# Patient Record
Sex: FEMALE | Race: WHITE | NOT HISPANIC OR LATINO | Employment: FULL TIME | URBAN - METROPOLITAN AREA
[De-identification: names, ages, dates, MRNs, and addresses within clinical notes are randomized per-mention and may not be internally consistent; named-entity substitution may affect disease eponyms.]

---

## 2020-09-08 ENCOUNTER — OFFICE VISIT (OUTPATIENT)
Dept: SURGERY | Facility: CLINIC | Age: 50
End: 2020-09-08

## 2020-09-08 VITALS — WEIGHT: 136 LBS | BODY MASS INDEX: 25.03 KG/M2 | HEIGHT: 62 IN

## 2020-09-08 DIAGNOSIS — N18.5 CKD (CHRONIC KIDNEY DISEASE) STAGE 5, GFR LESS THAN 15 ML/MIN (HCC): Primary | ICD-10-CM

## 2020-09-08 DIAGNOSIS — I15.9 SECONDARY HYPERTENSION: ICD-10-CM

## 2020-09-08 DIAGNOSIS — Q61.3 POLYCYSTIC KIDNEY DISEASE: ICD-10-CM

## 2020-09-08 PROCEDURE — 99204 OFFICE O/P NEW MOD 45 MIN: CPT | Performed by: SURGERY

## 2020-09-08 RX ORDER — CALCIUM ACETATE 667 MG/1
1334 CAPSULE ORAL
COMMUNITY
Start: 2020-08-25

## 2020-09-08 RX ORDER — CALCITRIOL 0.25 UG/1
0.25 CAPSULE, LIQUID FILLED ORAL SEE ADMIN INSTRUCTIONS
COMMUNITY
Start: 2020-08-22 | End: 2021-01-25

## 2020-09-08 RX ORDER — SODIUM BICARBONATE 650 MG/1
650 TABLET ORAL 2 TIMES DAILY
COMMUNITY

## 2020-09-08 RX ORDER — CARVEDILOL 3.12 MG/1
3.12 TABLET ORAL 2 TIMES DAILY
COMMUNITY
Start: 2020-08-18

## 2020-09-08 RX ORDER — OMEPRAZOLE 20 MG/1
20 CAPSULE, DELAYED RELEASE ORAL DAILY
COMMUNITY

## 2020-09-08 RX ORDER — SPIRONOLACTONE 25 MG/1
25 TABLET ORAL DAILY
COMMUNITY
Start: 2020-08-11

## 2020-09-08 RX ORDER — FUROSEMIDE 80 MG
80 TABLET ORAL 2 TIMES DAILY
COMMUNITY
Start: 2020-08-17

## 2020-09-08 NOTE — PROGRESS NOTES
Chief Complaint   Patient presents with   • PD Catheter Consultation       Subjective      Joelle Robison is a 50 y.o. female who is referred by Evelyne Mojica MD to be evaluated for peritoneal dialysis catheter placement. 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 . Patient does not have a AV access.  Patient has not had a recent intraabdominal infection. She reports having regular bowel movements.  Patient did have a Colonoscopy.   She was recently seen by cardiologist for decrease cardiac ejection fraction of 20%.  She is being treated medically.  She denies any chest pain but reports shortness of breath    Past Medical History:   Diagnosis Date   • Anemia    • CKD (chronic kidney disease)    • Heart disease    • High blood pressure        Past Surgical History:   Procedure Laterality Date   • APPENDECTOMY N/A 1974         Current Outpatient Medications:   •  calcitriol (ROCALTROL) 0.25 MCG capsule, Take  by mouth See Admin Instructions. Take 1 tablet by mouth on Monday, Wednesday, and Friday, Disp: , Rfl:   •  calcium acetate (PHOS BINDER,) 667 MG capsule capsule, Take 667 mg by mouth 3 (Three) Times a Day With Meals., Disp: , Rfl:   •  carvedilol (COREG) 3.125 MG tablet, TAKE 1 TABLET BY MOUTH IN THE MORNING AND 2 TABLETS IN THE EVENING, Disp: , Rfl:   •  furosemide (LASIX) 80 MG tablet, Take 80 mg by mouth 2 (Two) Times a Day., Disp: , Rfl:   •  omeprazole (priLOSEC) 20 MG capsule, Take 20 mg by mouth Daily., Disp: , Rfl:   •  sodium bicarbonate 650 MG tablet, Take 650 mg by mouth 4 (Four) Times a Day., Disp: , Rfl:   •  spironolactone (ALDACTONE) 25 MG tablet, TAKE 1 2 (ONE HALF) TABLET BY MOUTH ONCE DAILY, Disp: , Rfl:     Allergies   Allergen Reactions   • Codeine GI Intolerance       Family History   Problem Relation Age of Onset   • Kidney cancer Mother        Social History     Socioeconomic History   • Marital status: Single     Spouse name:  "Not on file   • Number of children: Not on file   • Years of education: Not on file   • Highest education level: Not on file   Tobacco Use   • Smoking status: Former Smoker   • Smokeless tobacco: Never Used   Substance and Sexual Activity   • Alcohol use: Yes     Comment: occ   • Drug use: Never   • Sexual activity: Defer     REVIEW OF SYSTEMS    Review of Systems   Constitutional: Positive for fatigue. Negative for unexpected weight change.   HENT: Negative for congestion and rhinorrhea.    Eyes: Negative for discharge and itching.   Respiratory: Negative for apnea and cough.    Cardiovascular: Negative for chest pain and leg swelling.   Gastrointestinal: Negative for abdominal distention and abdominal pain.   Endocrine: Negative for cold intolerance and heat intolerance.   Genitourinary: Negative for difficulty urinating and frequency.   Musculoskeletal: Negative for joint swelling.   Skin: Negative for color change and pallor.   Allergic/Immunologic: Negative for environmental allergies and food allergies.   Neurological: Negative for dizziness and seizures.   Hematological: Negative for adenopathy. Does not bruise/bleed easily.   Psychiatric/Behavioral: Negative for agitation and sleep disturbance.       Physical Examination  Ht 157.5 cm (62\")   Wt 61.7 kg (136 lb)   BMI 24.87 kg/m²   Body mass index is 24.87 kg/m².  Physical Exam   Constitutional: She is oriented to person, place, and time. She appears well-developed and well-nourished.   HENT:   Head: Normocephalic and atraumatic.   Eyes: Conjunctivae are normal. No scleral icterus.   Neck: Normal range of motion. Neck supple.   Cardiovascular: Normal rate.   Pulmonary/Chest: Effort normal and breath sounds normal.   Abdominal: Soft. Bowel sounds are normal. She exhibits no distension. There is no tenderness. No hernia.   Well-healed right lower quadrant incision, no evidence of hernia.  Kidneys can be palpated below the costal margin bilaterally "   Musculoskeletal: Normal range of motion.   Neurological: She is alert and oriented to person, place, and time.   Skin: Skin is warm and dry.   Psychiatric: She has a normal mood and affect.     Labs reviewed on nephrologist note from 8/11/2020    Assessment:   Joelle Robison is a 50 y.o. female with chronic kidney disease stage V not on hemodialysis.  Never had a colonoscopy and has anemia.  She is being worked up by cardiologist for low ejection fraction.  Discussed with patient about further step.  I recommend that she undergoes peritoneal dialysis catheter placement as well as colonoscopy.  She wants to have the colonoscopy done in [place] and I agree with that.    The procedure was explained in detail to the patient including risks and benefits.  The benefits including the possibility of having dialysis at home without the side effects of the hemodialysis.  The risks including but not limited to catheter dislodgment, obstruction, malfunction, bleeding, infection and possible injury to surrounding organs during peritoneal access. The patient understands that the catheter will not be used for dialysis for a period of approximately 2 weeks after its placement and that during this time they should not shower until the exit site is completely healed.    Patient would like to see her cardiologist before proceeding with peritoneal dialysis.  She will need to discuss with Dr. Mojica about timing.    Plan:     -Patient to call when ready for peritoneal dialysis catheter placement    Narciso Deleon MD  General, Minimally Invasive and Endoscopic Surgery  [facility]    [address], [address]  [place], [place], [address]  P: [phone]  F: [phone]

## 2020-12-15 ENCOUNTER — TELEPHONE (OUTPATIENT)
Dept: SURGERY | Facility: CLINIC | Age: 50
End: 2020-12-15

## 2020-12-15 NOTE — TELEPHONE ENCOUNTER
Patient called to schedule a PD Cath placement. You saw her in September for the consult. Do you want to see her in the office again or place orders?

## 2020-12-16 NOTE — TELEPHONE ENCOUNTER
SPOKE TO ABRAN WITH DR PHAN OFFICE SHE WILL SPEAK TO HIM AND CALL ME BACK TO ADVISE IF APPT NEEDED OR LETTER CAN BE FAXED.

## 2020-12-21 NOTE — TELEPHONE ENCOUNTER
Spoke to Candace and she advised letter ready just waiting for Dr Rees to review and sign then she will fax to me.

## 2020-12-28 ENCOUNTER — PREP FOR SURGERY (OUTPATIENT)
Dept: OTHER | Facility: HOSPITAL | Age: 50
End: 2020-12-28

## 2020-12-28 DIAGNOSIS — N18.5 CKD (CHRONIC KIDNEY DISEASE) STAGE 5, GFR LESS THAN 15 ML/MIN (HCC): Primary | ICD-10-CM

## 2020-12-28 RX ORDER — CEFAZOLIN SODIUM 2 G/100ML
2 INJECTION, SOLUTION INTRAVENOUS ONCE
Status: CANCELLED | OUTPATIENT
Start: 2021-01-27 | End: 2020-12-28

## 2021-01-04 ENCOUNTER — TELEPHONE (OUTPATIENT)
Dept: SURGERY | Facility: CLINIC | Age: 51
End: 2021-01-04

## 2021-01-04 PROBLEM — N18.5 CKD (CHRONIC KIDNEY DISEASE) STAGE 5, GFR LESS THAN 15 ML/MIN (HCC): Status: ACTIVE | Noted: 2021-01-04

## 2021-01-04 NOTE — TELEPHONE ENCOUNTER
Patient called to inform of change of address. Said she just scheduled surgery and wanted to make sure of correct address as we were to be sending paperwork in the mail to her.

## 2021-01-25 ENCOUNTER — APPOINTMENT (OUTPATIENT)
Dept: PREADMISSION TESTING | Facility: HOSPITAL | Age: 51
End: 2021-01-25

## 2021-01-25 VITALS
HEART RATE: 107 BPM | RESPIRATION RATE: 20 BRPM | OXYGEN SATURATION: 98 % | DIASTOLIC BLOOD PRESSURE: 97 MMHG | TEMPERATURE: 98.5 F | WEIGHT: 149.1 LBS | HEIGHT: 62 IN | SYSTOLIC BLOOD PRESSURE: 144 MMHG | BODY MASS INDEX: 27.44 KG/M2

## 2021-01-25 DIAGNOSIS — N18.5 CKD (CHRONIC KIDNEY DISEASE) STAGE 5, GFR LESS THAN 15 ML/MIN (HCC): ICD-10-CM

## 2021-01-25 LAB
ANION GAP SERPL CALCULATED.3IONS-SCNC: 17.3 MMOL/L (ref 5–15)
BASOPHILS # BLD AUTO: 0.13 10*3/MM3 (ref 0–0.2)
BASOPHILS NFR BLD AUTO: 1.3 % (ref 0–1.5)
BUN SERPL-MCNC: 67 MG/DL (ref 6–20)
BUN/CREAT SERPL: 9.6 (ref 7–25)
CALCIUM SPEC-SCNC: 11.1 MG/DL (ref 8.6–10.5)
CHLORIDE SERPL-SCNC: 99 MMOL/L (ref 98–107)
CO2 SERPL-SCNC: 24.7 MMOL/L (ref 22–29)
CREAT SERPL-MCNC: 7.01 MG/DL (ref 0.57–1)
DEPRECATED RDW RBC AUTO: 54.2 FL (ref 37–54)
EOSINOPHIL # BLD AUTO: 0.75 10*3/MM3 (ref 0–0.4)
EOSINOPHIL NFR BLD AUTO: 7.5 % (ref 0.3–6.2)
ERYTHROCYTE [DISTWIDTH] IN BLOOD BY AUTOMATED COUNT: 14.7 % (ref 12.3–15.4)
GFR SERPL CREATININE-BSD FRML MDRD: 6 ML/MIN/1.73
GFR SERPL CREATININE-BSD FRML MDRD: ABNORMAL ML/MIN/{1.73_M2}
GLUCOSE SERPL-MCNC: 82 MG/DL (ref 65–99)
HCG SERPL QL: NEGATIVE
HCT VFR BLD AUTO: 32 % (ref 34–46.6)
HGB BLD-MCNC: 11.2 G/DL (ref 12–15.9)
IMM GRANULOCYTES # BLD AUTO: 0.02 10*3/MM3 (ref 0–0.05)
IMM GRANULOCYTES NFR BLD AUTO: 0.2 % (ref 0–0.5)
LYMPHOCYTES # BLD AUTO: 2.37 10*3/MM3 (ref 0.7–3.1)
LYMPHOCYTES NFR BLD AUTO: 23.7 % (ref 19.6–45.3)
MCH RBC QN AUTO: 35.7 PG (ref 26.6–33)
MCHC RBC AUTO-ENTMCNC: 35 G/DL (ref 31.5–35.7)
MCV RBC AUTO: 101.9 FL (ref 79–97)
MONOCYTES # BLD AUTO: 0.88 10*3/MM3 (ref 0.1–0.9)
MONOCYTES NFR BLD AUTO: 8.8 % (ref 5–12)
NEUTROPHILS NFR BLD AUTO: 5.83 10*3/MM3 (ref 1.7–7)
NEUTROPHILS NFR BLD AUTO: 58.5 % (ref 42.7–76)
NRBC BLD AUTO-RTO: 0 /100 WBC (ref 0–0.2)
PLATELET # BLD AUTO: 218 10*3/MM3 (ref 140–450)
PMV BLD AUTO: 10 FL (ref 6–12)
POTASSIUM SERPL-SCNC: 4.5 MMOL/L (ref 3.5–5.2)
QT INTERVAL: 406 MS
RBC # BLD AUTO: 3.14 10*6/MM3 (ref 3.77–5.28)
SODIUM SERPL-SCNC: 141 MMOL/L (ref 136–145)
WBC # BLD AUTO: 9.98 10*3/MM3 (ref 3.4–10.8)

## 2021-01-25 PROCEDURE — U0004 COV-19 TEST NON-CDC HGH THRU: HCPCS | Performed by: NURSE PRACTITIONER

## 2021-01-25 PROCEDURE — 36415 COLL VENOUS BLD VENIPUNCTURE: CPT

## 2021-01-25 PROCEDURE — 93010 ELECTROCARDIOGRAM REPORT: CPT | Performed by: INTERNAL MEDICINE

## 2021-01-25 PROCEDURE — 93005 ELECTROCARDIOGRAM TRACING: CPT

## 2021-01-25 PROCEDURE — 84703 CHORIONIC GONADOTROPIN ASSAY: CPT

## 2021-01-25 PROCEDURE — C9803 HOPD COVID-19 SPEC COLLECT: HCPCS | Performed by: NURSE PRACTITIONER

## 2021-01-25 PROCEDURE — 85025 COMPLETE CBC W/AUTO DIFF WBC: CPT

## 2021-01-25 PROCEDURE — 80048 BASIC METABOLIC PNL TOTAL CA: CPT

## 2021-01-25 RX ORDER — CARVEDILOL 3.12 MG/1
4.69 TABLET ORAL 2 TIMES DAILY WITH MEALS
Status: ON HOLD | COMMUNITY
End: 2022-08-06

## 2021-01-25 NOTE — DISCHARGE INSTRUCTIONS
Take the following medications the morning of surgery:  CARVEDILOL, OMEPRAZOLE    ARRIVE TO MAIN OR DESK 1- AT 10:00AM      If you are on prescription narcotic pain medication to control your pain you may also take that medication the morning of surgery.    General Instructions:  • Do not eat solid food after midnight the night before surgery.  • You may drink clear liquids day of surgery but must stop at least one hour before your hospital arrival time.(9:00AM)  • It is beneficial for you to have a clear drink that contains carbohydrates the day of surgery.  We suggest a 12 to 20 ounce bottle of Gatorade or Powerade for non-diabetic patients or a 12 to 20 ounce bottle of G2 or Powerade Zero for diabetic patients. (Pediatric patients, are not advised to drink a 12 to 20 ounce carbohydrate drink)    Clear liquids are liquids you can see through.  Nothing red in color.     Plain water                               Sports drinks  Sodas                                   Gelatin (Jell-O)  Fruit juices without pulp such as white grape juice and apple juice  Popsicles that contain no fruit or yogurt  Tea or coffee (no cream or milk added)  Gatorade / Powerade  G2 / Powerade Zero    • Infants may have breast milk up to four hours before surgery.  • Infants drinking formula may drink formula up to six hours before surgery.   • Patients who avoid smoking, chewing tobacco and alcohol for 4 weeks prior to surgery have a reduced risk of post-operative complications.  Quit smoking as many days before surgery as you can.  • Do not smoke, use chewing tobacco or drink alcohol the day of surgery.   • If applicable bring your C-PAP/ BI-PAP machine.  • Bring any papers given to you in the doctor’s office.  • Wear clean comfortable clothes.  • Do not wear contact lenses, false eyelashes or make-up.  Bring a case for your glasses.   • Bring crutches or walker if applicable.  • Remove all piercings.  Leave jewelry and any other  valuables at home.  • Hair extensions with metal clips must be removed prior to surgery.  • The Pre-Admission Testing nurse will instruct you to bring medications if unable to obtain an accurate list in Pre-Admission Testing.          Preventing a Surgical Site Infection:  • For 2 to 3 days before surgery, avoid shaving with a razor because the razor can irritate skin and make it easier to develop an infection.    • Any areas of open skin can increase the risk of a post-operative wound infection by allowing bacteria to enter and travel throughout the body.  Notify your surgeon if you have any skin wounds / rashes even if it is not near the expected surgical site.  The area will need assessed to determine if surgery should be delayed until it is healed.  • The night prior to surgery shower using a fresh bar of anti-bacterial soap (such as Dial) and clean washcloth.  Sleep in a clean bed with clean clothing.  Do not allow pets to sleep with you.  • Shower on the morning of surgery using a fresh bar of anti-bacterial soap (such as Dial) and clean washcloth.  Dry with a clean towel and dress in clean clothing.  • Ask your surgeon if you will be receiving antibiotics prior to surgery.  • Make sure you, your family, and all healthcare providers clean their hands with soap and water or an alcohol based hand  before caring for you or your wound.    Day of surgery:  Your arrival time is approximately two hours before your scheduled surgery time.  Upon arrival, a Pre-op nurse and Anesthesiologist will review your health history, obtain vital signs, and answer questions you may have.  The only belongings needed at this time will be a list of your home medications and if applicable your C-PAP/BI-PAP machine.  A Pre-op nurse will start an IV and you may receive medication in preparation for surgery, including something to help you relax.     Please be aware that surgery does come with discomfort.  We want to make every  effort to control your discomfort so please discuss any uncontrolled symptoms with your nurse.   Your doctor will most likely have prescribed pain medications.      If you are going home after surgery you will receive individualized written care instructions before being discharged.  A responsible adult must drive you to and from the hospital on the day of your surgery and stay with you for 24 hours.  Discharge prescriptions can be filled by the hospital pharmacy during regular pharmacy hours.  If you are having surgery late in the day/evening your prescription may be e-prescribed to your pharmacy.  Please verify your pharmacy hours or chose a 24 hour pharmacy to avoid not having access to your prescription because your pharmacy has closed for the day.    If you are staying overnight following surgery, you will be transported to your hospital room following the recovery period.  [facility] has all private rooms.    If you have any questions please call Pre-Admission Testing at [phone].  Deductibles and co-payments are collected on the day of service. Please be prepared to pay the required co-pay, deductible or deposit on the day of service as defined by your plan.    Patient Education for Self-Quarantine Process    Following your COVID testing, we strongly recommend that you do not leave your home after you have been tested for COVID except to get medical care. This includes not going to work, school or to public areas.  If this is not possible for you to do please limit your activities to only required outings.  Be sure to wear a mask when you are with other people, practice social distancing and wash your hands frequently.      The following items provide additional details to keep you safe.  • Wash your hands with soap and water frequently for at least 20 seconds.   • Avoid touching your eyes, nose and mouth with unwashed hands.  • Do not share anything - utensils, towels, food from the same  bowl.   • Have your own utensils, drinking glass, dishes, towels and bedding.   • Do not have visitors.   • Do use FaceTime to stay in touch with family and friends.  • You should stay in a specific room away from others if possible.   • Stay at least 6 feet away from others in the home if you cannot have a dedicated room to yourself.   • Do not snuggle with your pet. While the CDC says there is no evidence that pets can spread COVID-19 or be infected from humans, it is probably best to avoid “petting, snuggling, being kissed or licked and sharing food (during self-quarantine)”, according to the CDC.   • Sanitize household surfaces daily. Include all high touch areas (door handles, light switches, phones, countertops, etc.)  • Do not share a bathroom with others, if possible.   • Wear a mask around others in your home if you are unable to stay in a separate room or 6 feet apart. If  you are unable to wear a mask, have your family member wear a mask if they must be within 6 feet of you.   Call your surgeon immediately if you experience any of the following symptoms:  • Sore Throat  • Shortness of Breath or difficulty breathing  • Cough  • Chills  • Body soreness or muscle pain  • Headache  • Fever  • New loss of taste or smell  • Do not arrive for your surgery ill.  Your procedure will need to be rescheduled to another time.  You will need to call your physician before the day of surgery to avoid any unnecessary exposure to hospital staff as well as other patients.    CHLORHEXIDINE CLOTH INSTRUCTIONS  The morning of surgery follow these instructions using the Chlorhexidine cloths you've been given.  These steps reduce bacteria on the body.  Do not use the cloths near your eyes, ears mouth, genitalia or on open wounds.  Throw the cloths away after use but do not try to flush them down a toilet.      • Open and remove one cloth at a time from the package.    • Leave the cloth unfolded and begin the bathing.  • Massage  the skin with the cloths using gentle pressure to remove bacteria.  Do not scrub harshly.   • Follow the steps below with one 2% CHG cloth per area (6 total cloths).  • One cloth for neck, shoulders and chest.  • One cloth for both arms, hands, fingers and underarms (do underarms last).  • One cloth for the abdomen followed by groin.  • One cloth for right leg and foot including between the toes.  • One cloth for left leg and foot including between the toes.  • The last cloth is to be used for the back of the neck, back and buttocks.    Allow the CHG to air dry 3 minutes on the skin which will give it time to work and decrease the chance of irritation.  The skin may feel sticky until it is dry.  Do not rinse with water or any other liquid or you will lose the beneficial effects of the CHG.  If mild skin irritation occurs, do rinse the skin to remove the CHG.  Report this to the nurse at time of admission.  Do not apply lotions, creams, ointments, deodorants or perfumes after using the clothes. Dress in clean clothes before coming to the hospital.

## 2021-01-26 LAB — SARS-COV-2 RNA RESP QL NAA+PROBE: NOT DETECTED

## 2021-01-27 ENCOUNTER — ANESTHESIA (OUTPATIENT)
Dept: PERIOP | Facility: HOSPITAL | Age: 51
End: 2021-01-27

## 2021-01-27 ENCOUNTER — ANESTHESIA EVENT (OUTPATIENT)
Dept: PERIOP | Facility: HOSPITAL | Age: 51
End: 2021-01-27

## 2021-01-27 ENCOUNTER — HOSPITAL ENCOUNTER (OUTPATIENT)
Facility: HOSPITAL | Age: 51
Setting detail: HOSPITAL OUTPATIENT SURGERY
Discharge: HOME | End: 2021-01-27
Attending: SURGERY | Admitting: SURGERY

## 2021-01-27 VITALS
SYSTOLIC BLOOD PRESSURE: 122 MMHG | BODY MASS INDEX: 27.33 KG/M2 | HEIGHT: 62 IN | WEIGHT: 148.5 LBS | RESPIRATION RATE: 16 BRPM | OXYGEN SATURATION: 97 % | DIASTOLIC BLOOD PRESSURE: 93 MMHG | TEMPERATURE: 97.6 F | HEART RATE: 93 BPM

## 2021-01-27 DIAGNOSIS — Z99.2 PERITONEAL DIALYSIS CATHETER IN PLACE (HCC): Primary | ICD-10-CM

## 2021-01-27 DIAGNOSIS — N18.5 CKD (CHRONIC KIDNEY DISEASE) STAGE 5, GFR LESS THAN 15 ML/MIN (HCC): ICD-10-CM

## 2021-01-27 PROCEDURE — 49326 LAP W/OMENTOPEXY ADD-ON: CPT | Performed by: SPECIALIST/TECHNOLOGIST, OTHER

## 2021-01-27 PROCEDURE — 25010000002 FENTANYL CITRATE (PF) 100 MCG/2ML SOLUTION: Performed by: NURSE ANESTHETIST, CERTIFIED REGISTERED

## 2021-01-27 PROCEDURE — S0260 H&P FOR SURGERY: HCPCS | Performed by: SURGERY

## 2021-01-27 PROCEDURE — 25010000002 HEPARIN (PORCINE) PER 1000 UNITS: Performed by: SURGERY

## 2021-01-27 PROCEDURE — 25010000002 PHENYLEPHRINE PER 1 ML: Performed by: NURSE ANESTHETIST, CERTIFIED REGISTERED

## 2021-01-27 PROCEDURE — 25010000003 CEFAZOLIN IN DEXTROSE 2-4 GM/100ML-% SOLUTION: Performed by: SURGERY

## 2021-01-27 PROCEDURE — 25010000002 PROPOFOL 10 MG/ML EMULSION: Performed by: NURSE ANESTHETIST, CERTIFIED REGISTERED

## 2021-01-27 PROCEDURE — 63710000001 PROMETHAZINE PER 12.5 MG: Performed by: NURSE ANESTHETIST, CERTIFIED REGISTERED

## 2021-01-27 PROCEDURE — 25010000003 HEPARIN LOCK FLUSH PER 10 UNITS: Performed by: SURGERY

## 2021-01-27 PROCEDURE — 25010000002 ONDANSETRON PER 1 MG: Performed by: NURSE ANESTHETIST, CERTIFIED REGISTERED

## 2021-01-27 PROCEDURE — 49324 LAP INSERT TUNNEL IP CATH: CPT | Performed by: SPECIALIST/TECHNOLOGIST, OTHER

## 2021-01-27 PROCEDURE — 25010000002 HYDROMORPHONE PER 4 MG: Performed by: NURSE ANESTHETIST, CERTIFIED REGISTERED

## 2021-01-27 PROCEDURE — 49324 LAP INSERT TUNNEL IP CATH: CPT | Performed by: SURGERY

## 2021-01-27 PROCEDURE — 25010000002 DEXAMETHASONE PER 1 MG: Performed by: NURSE ANESTHETIST, CERTIFIED REGISTERED

## 2021-01-27 PROCEDURE — 49326 LAP W/OMENTOPEXY ADD-ON: CPT | Performed by: SURGERY

## 2021-01-27 PROCEDURE — C1750 CATH, HEMODIALYSIS,LONG-TERM: HCPCS | Performed by: SURGERY

## 2021-01-27 PROCEDURE — 25010000002 MIDAZOLAM PER 1 MG: Performed by: ANESTHESIOLOGY

## 2021-01-27 DEVICE — IMPLANTABLE DEVICE: Type: IMPLANTABLE DEVICE | Site: ABDOMEN | Status: FUNCTIONAL

## 2021-01-27 RX ORDER — DIPHENHYDRAMINE HYDROCHLORIDE 50 MG/ML
12.5 INJECTION INTRAMUSCULAR; INTRAVENOUS
Status: DISCONTINUED | OUTPATIENT
Start: 2021-01-27 | End: 2021-01-27 | Stop reason: HOSPADM

## 2021-01-27 RX ORDER — MIDAZOLAM HYDROCHLORIDE 1 MG/ML
2 INJECTION INTRAMUSCULAR; INTRAVENOUS
Status: DISCONTINUED | OUTPATIENT
Start: 2021-01-27 | End: 2021-01-27 | Stop reason: HOSPADM

## 2021-01-27 RX ORDER — ROCURONIUM BROMIDE 10 MG/ML
INJECTION, SOLUTION INTRAVENOUS AS NEEDED
Status: DISCONTINUED | OUTPATIENT
Start: 2021-01-27 | End: 2021-01-27 | Stop reason: SURG

## 2021-01-27 RX ORDER — HYDROCODONE BITARTRATE AND ACETAMINOPHEN 5; 325 MG/1; MG/1
1 TABLET ORAL EVERY 6 HOURS PRN
Qty: 30 TABLET | Refills: 0 | Status: SHIPPED | OUTPATIENT
Start: 2021-01-27 | End: 2021-02-10

## 2021-01-27 RX ORDER — HYDROCODONE BITARTRATE AND ACETAMINOPHEN 7.5; 325 MG/1; MG/1
1 TABLET ORAL ONCE AS NEEDED
Status: DISCONTINUED | OUTPATIENT
Start: 2021-01-27 | End: 2021-01-27 | Stop reason: HOSPADM

## 2021-01-27 RX ORDER — ACETAMINOPHEN 325 MG/1
650 TABLET ORAL ONCE
Status: COMPLETED | OUTPATIENT
Start: 2021-01-27 | End: 2021-01-27

## 2021-01-27 RX ORDER — CEFAZOLIN SODIUM 2 G/100ML
2 INJECTION, SOLUTION INTRAVENOUS ONCE
Status: COMPLETED | OUTPATIENT
Start: 2021-01-27 | End: 2021-01-27

## 2021-01-27 RX ORDER — FENTANYL CITRATE 50 UG/ML
INJECTION, SOLUTION INTRAMUSCULAR; INTRAVENOUS AS NEEDED
Status: DISCONTINUED | OUTPATIENT
Start: 2021-01-27 | End: 2021-01-27 | Stop reason: SURG

## 2021-01-27 RX ORDER — BUPIVACAINE HYDROCHLORIDE AND EPINEPHRINE 5; 5 MG/ML; UG/ML
INJECTION, SOLUTION PERINEURAL AS NEEDED
Status: DISCONTINUED | OUTPATIENT
Start: 2021-01-27 | End: 2021-01-27 | Stop reason: HOSPADM

## 2021-01-27 RX ORDER — SENNA PLUS 8.6 MG/1
1 TABLET ORAL ONCE
Status: COMPLETED | OUTPATIENT
Start: 2021-01-27 | End: 2021-01-27

## 2021-01-27 RX ORDER — FENTANYL CITRATE 50 UG/ML
25 INJECTION, SOLUTION INTRAMUSCULAR; INTRAVENOUS
Status: DISCONTINUED | OUTPATIENT
Start: 2021-01-27 | End: 2021-01-27 | Stop reason: HOSPADM

## 2021-01-27 RX ORDER — LABETALOL HYDROCHLORIDE 5 MG/ML
5 INJECTION, SOLUTION INTRAVENOUS
Status: DISCONTINUED | OUTPATIENT
Start: 2021-01-27 | End: 2021-01-27 | Stop reason: HOSPADM

## 2021-01-27 RX ORDER — PROMETHAZINE HYDROCHLORIDE 25 MG/1
12.5 TABLET ORAL ONCE AS NEEDED
Status: DISCONTINUED | OUTPATIENT
Start: 2021-01-27 | End: 2021-01-27 | Stop reason: HOSPADM

## 2021-01-27 RX ORDER — HYDROMORPHONE HYDROCHLORIDE 1 MG/ML
0.25 INJECTION, SOLUTION INTRAMUSCULAR; INTRAVENOUS; SUBCUTANEOUS
Status: DISCONTINUED | OUTPATIENT
Start: 2021-01-27 | End: 2021-01-27 | Stop reason: HOSPADM

## 2021-01-27 RX ORDER — SCOLOPAMINE TRANSDERMAL SYSTEM 1 MG/1
1 PATCH, EXTENDED RELEASE TRANSDERMAL
Status: DISCONTINUED | OUTPATIENT
Start: 2021-01-27 | End: 2021-01-27 | Stop reason: HOSPADM

## 2021-01-27 RX ORDER — AMOXICILLIN 250 MG
2 CAPSULE ORAL DAILY PRN
Qty: 30 TABLET | Refills: 1 | Status: SHIPPED | OUTPATIENT
Start: 2021-01-27 | End: 2022-01-27

## 2021-01-27 RX ORDER — ONDANSETRON 4 MG/1
4 TABLET, FILM COATED ORAL DAILY PRN
Qty: 30 TABLET | Refills: 1 | Status: SHIPPED | OUTPATIENT
Start: 2021-01-27 | End: 2021-02-10

## 2021-01-27 RX ORDER — FAMOTIDINE 10 MG/ML
20 INJECTION, SOLUTION INTRAVENOUS ONCE
Status: COMPLETED | OUTPATIENT
Start: 2021-01-27 | End: 2021-01-27

## 2021-01-27 RX ORDER — HYDROCODONE BITARTRATE AND ACETAMINOPHEN 5; 325 MG/1; MG/1
1 TABLET ORAL ONCE AS NEEDED
Status: CANCELLED | OUTPATIENT
Start: 2021-01-27 | End: 2021-02-03

## 2021-01-27 RX ORDER — PROPOFOL 10 MG/ML
VIAL (ML) INTRAVENOUS AS NEEDED
Status: DISCONTINUED | OUTPATIENT
Start: 2021-01-27 | End: 2021-01-27 | Stop reason: SURG

## 2021-01-27 RX ORDER — FENTANYL CITRATE 50 UG/ML
50 INJECTION, SOLUTION INTRAMUSCULAR; INTRAVENOUS
Status: DISCONTINUED | OUTPATIENT
Start: 2021-01-27 | End: 2021-01-27 | Stop reason: HOSPADM

## 2021-01-27 RX ORDER — PROMETHAZINE HYDROCHLORIDE 25 MG/1
25 SUPPOSITORY RECTAL ONCE AS NEEDED
Status: COMPLETED | OUTPATIENT
Start: 2021-01-27 | End: 2021-01-27

## 2021-01-27 RX ORDER — MIDAZOLAM HYDROCHLORIDE 1 MG/ML
1 INJECTION INTRAMUSCULAR; INTRAVENOUS
Status: DISCONTINUED | OUTPATIENT
Start: 2021-01-27 | End: 2021-01-27 | Stop reason: HOSPADM

## 2021-01-27 RX ORDER — SODIUM CHLORIDE, SODIUM LACTATE, POTASSIUM CHLORIDE, CALCIUM CHLORIDE 600; 310; 30; 20 MG/100ML; MG/100ML; MG/100ML; MG/100ML
INJECTION, SOLUTION INTRAVENOUS CONTINUOUS PRN
Status: DISCONTINUED | OUTPATIENT
Start: 2021-01-27 | End: 2021-01-27 | Stop reason: SURG

## 2021-01-27 RX ORDER — SODIUM CHLORIDE 0.9 % (FLUSH) 0.9 %
3 SYRINGE (ML) INJECTION EVERY 12 HOURS SCHEDULED
Status: DISCONTINUED | OUTPATIENT
Start: 2021-01-27 | End: 2021-01-27 | Stop reason: HOSPADM

## 2021-01-27 RX ORDER — PROMETHAZINE HYDROCHLORIDE 25 MG/1
25 TABLET ORAL ONCE AS NEEDED
Status: COMPLETED | OUTPATIENT
Start: 2021-01-27 | End: 2021-01-27

## 2021-01-27 RX ORDER — EPHEDRINE SULFATE 50 MG/ML
5 INJECTION, SOLUTION INTRAVENOUS ONCE AS NEEDED
Status: DISCONTINUED | OUTPATIENT
Start: 2021-01-27 | End: 2021-01-27 | Stop reason: HOSPADM

## 2021-01-27 RX ORDER — HEPARIN SODIUM (PORCINE) LOCK FLUSH IV SOLN 100 UNIT/ML 100 UNIT/ML
SOLUTION INTRAVENOUS AS NEEDED
Status: DISCONTINUED | OUTPATIENT
Start: 2021-01-27 | End: 2021-01-27 | Stop reason: HOSPADM

## 2021-01-27 RX ORDER — NALOXONE HCL 0.4 MG/ML
0.2 VIAL (ML) INJECTION AS NEEDED
Status: DISCONTINUED | OUTPATIENT
Start: 2021-01-27 | End: 2021-01-27 | Stop reason: HOSPADM

## 2021-01-27 RX ORDER — DIPHENHYDRAMINE HCL 25 MG
25 CAPSULE ORAL
Status: DISCONTINUED | OUTPATIENT
Start: 2021-01-27 | End: 2021-01-27 | Stop reason: HOSPADM

## 2021-01-27 RX ORDER — ONDANSETRON 2 MG/ML
4 INJECTION INTRAMUSCULAR; INTRAVENOUS ONCE AS NEEDED
Status: COMPLETED | OUTPATIENT
Start: 2021-01-27 | End: 2021-01-27

## 2021-01-27 RX ORDER — SODIUM CHLORIDE 0.9 % (FLUSH) 0.9 %
3-10 SYRINGE (ML) INJECTION AS NEEDED
Status: DISCONTINUED | OUTPATIENT
Start: 2021-01-27 | End: 2021-01-27 | Stop reason: HOSPADM

## 2021-01-27 RX ORDER — FLUMAZENIL 0.1 MG/ML
0.2 INJECTION INTRAVENOUS AS NEEDED
Status: DISCONTINUED | OUTPATIENT
Start: 2021-01-27 | End: 2021-01-27 | Stop reason: HOSPADM

## 2021-01-27 RX ORDER — EPHEDRINE SULFATE 50 MG/ML
INJECTION, SOLUTION INTRAVENOUS AS NEEDED
Status: DISCONTINUED | OUTPATIENT
Start: 2021-01-27 | End: 2021-01-27 | Stop reason: SURG

## 2021-01-27 RX ORDER — ONDANSETRON 2 MG/ML
INJECTION INTRAMUSCULAR; INTRAVENOUS AS NEEDED
Status: DISCONTINUED | OUTPATIENT
Start: 2021-01-27 | End: 2021-01-27 | Stop reason: SURG

## 2021-01-27 RX ORDER — DEXAMETHASONE SODIUM PHOSPHATE 10 MG/ML
INJECTION INTRAMUSCULAR; INTRAVENOUS AS NEEDED
Status: DISCONTINUED | OUTPATIENT
Start: 2021-01-27 | End: 2021-01-27 | Stop reason: SURG

## 2021-01-27 RX ORDER — LIDOCAINE HYDROCHLORIDE 20 MG/ML
INJECTION, SOLUTION INFILTRATION; PERINEURAL AS NEEDED
Status: DISCONTINUED | OUTPATIENT
Start: 2021-01-27 | End: 2021-01-27 | Stop reason: SURG

## 2021-01-27 RX ORDER — SODIUM CHLORIDE 9 MG/ML
9 INJECTION, SOLUTION INTRAVENOUS CONTINUOUS PRN
Status: DISCONTINUED | OUTPATIENT
Start: 2021-01-27 | End: 2021-01-27 | Stop reason: HOSPADM

## 2021-01-27 RX ORDER — OXYCODONE AND ACETAMINOPHEN 7.5; 325 MG/1; MG/1
1 TABLET ORAL ONCE AS NEEDED
Status: DISCONTINUED | OUTPATIENT
Start: 2021-01-27 | End: 2021-01-27 | Stop reason: HOSPADM

## 2021-01-27 RX ORDER — LIDOCAINE HYDROCHLORIDE 10 MG/ML
0.5 INJECTION, SOLUTION EPIDURAL; INFILTRATION; INTRACAUDAL; PERINEURAL ONCE AS NEEDED
Status: DISCONTINUED | OUTPATIENT
Start: 2021-01-27 | End: 2021-01-27 | Stop reason: HOSPADM

## 2021-01-27 RX ADMIN — FENTANYL CITRATE 50 MCG: 50 INJECTION INTRAMUSCULAR; INTRAVENOUS at 11:52

## 2021-01-27 RX ADMIN — PHENYLEPHRINE HYDROCHLORIDE 100 MCG: 10 INJECTION INTRAVENOUS at 11:59

## 2021-01-27 RX ADMIN — LIDOCAINE HYDROCHLORIDE 60 MG: 20 INJECTION, SOLUTION INFILTRATION; PERINEURAL at 11:52

## 2021-01-27 RX ADMIN — FAMOTIDINE 20 MG: 10 INJECTION INTRAVENOUS at 11:09

## 2021-01-27 RX ADMIN — PROPOFOL 50 MG: 10 INJECTION, EMULSION INTRAVENOUS at 11:53

## 2021-01-27 RX ADMIN — PROMETHAZINE HYDROCHLORIDE 25 MG: 12.5 TABLET ORAL at 17:13

## 2021-01-27 RX ADMIN — SODIUM CHLORIDE 9 ML/HR: 9 INJECTION, SOLUTION INTRAVENOUS at 11:16

## 2021-01-27 RX ADMIN — SODIUM CHLORIDE, POTASSIUM CHLORIDE, SODIUM LACTATE AND CALCIUM CHLORIDE: 600; 310; 30; 20 INJECTION, SOLUTION INTRAVENOUS at 11:50

## 2021-01-27 RX ADMIN — SENNOSIDES 1 TABLET: 8.6 TABLET, FILM COATED ORAL at 15:20

## 2021-01-27 RX ADMIN — PHENYLEPHRINE HYDROCHLORIDE 150 MCG: 10 INJECTION INTRAVENOUS at 12:20

## 2021-01-27 RX ADMIN — EPHEDRINE SULFATE 10 MG: 50 INJECTION INTRAVENOUS at 12:20

## 2021-01-27 RX ADMIN — PHENYLEPHRINE HYDROCHLORIDE 150 MCG: 10 INJECTION INTRAVENOUS at 12:05

## 2021-01-27 RX ADMIN — ONDANSETRON 4 MG: 2 INJECTION INTRAMUSCULAR; INTRAVENOUS at 14:45

## 2021-01-27 RX ADMIN — PHENYLEPHRINE HYDROCHLORIDE 250 MCG: 10 INJECTION INTRAVENOUS at 12:23

## 2021-01-27 RX ADMIN — SCOPALAMINE 1 PATCH: 1 PATCH, EXTENDED RELEASE TRANSDERMAL at 11:09

## 2021-01-27 RX ADMIN — DEXAMETHASONE SODIUM PHOSPHATE 8 MG: 10 INJECTION INTRAMUSCULAR; INTRAVENOUS at 11:57

## 2021-01-27 RX ADMIN — ROCURONIUM BROMIDE 5 MG: 10 INJECTION INTRAVENOUS at 12:10

## 2021-01-27 RX ADMIN — PHENYLEPHRINE HYDROCHLORIDE 200 MCG: 10 INJECTION INTRAVENOUS at 12:27

## 2021-01-27 RX ADMIN — ONDANSETRON HYDROCHLORIDE 4 MG: 2 SOLUTION INTRAMUSCULAR; INTRAVENOUS at 12:25

## 2021-01-27 RX ADMIN — FENTANYL CITRATE 25 MCG: 50 INJECTION, SOLUTION INTRAMUSCULAR; INTRAVENOUS at 13:15

## 2021-01-27 RX ADMIN — ROCURONIUM BROMIDE 20 MG: 10 INJECTION INTRAVENOUS at 11:52

## 2021-01-27 RX ADMIN — FENTANYL CITRATE 25 MCG: 50 INJECTION, SOLUTION INTRAMUSCULAR; INTRAVENOUS at 13:10

## 2021-01-27 RX ADMIN — PHENYLEPHRINE HYDROCHLORIDE 150 MCG: 10 INJECTION INTRAVENOUS at 12:25

## 2021-01-27 RX ADMIN — EPHEDRINE SULFATE 15 MG: 50 INJECTION INTRAVENOUS at 12:23

## 2021-01-27 RX ADMIN — EPHEDRINE SULFATE 15 MG: 50 INJECTION INTRAVENOUS at 12:25

## 2021-01-27 RX ADMIN — EPHEDRINE SULFATE 10 MG: 50 INJECTION INTRAVENOUS at 12:27

## 2021-01-27 RX ADMIN — ACETAMINOPHEN 650 MG: 325 TABLET, FILM COATED ORAL at 14:40

## 2021-01-27 RX ADMIN — MIDAZOLAM 1 MG: 1 INJECTION INTRAMUSCULAR; INTRAVENOUS at 11:39

## 2021-01-27 RX ADMIN — PHENYLEPHRINE HYDROCHLORIDE 200 MCG: 10 INJECTION INTRAVENOUS at 12:08

## 2021-01-27 RX ADMIN — CEFAZOLIN SODIUM 2 G: 2 INJECTION, SOLUTION INTRAVENOUS at 11:58

## 2021-01-27 RX ADMIN — HYDROMORPHONE HYDROCHLORIDE 0.25 MG: 1 INJECTION, SOLUTION INTRAMUSCULAR; INTRAVENOUS; SUBCUTANEOUS at 13:45

## 2021-01-27 RX ADMIN — HYDROMORPHONE HYDROCHLORIDE 0.25 MG: 1 INJECTION, SOLUTION INTRAMUSCULAR; INTRAVENOUS; SUBCUTANEOUS at 13:50

## 2021-01-27 RX ADMIN — PROPOFOL 150 MG: 10 INJECTION, EMULSION INTRAVENOUS at 11:52

## 2021-01-27 RX ADMIN — SUGAMMADEX 200 MG: 100 INJECTION, SOLUTION INTRAVENOUS at 12:33

## 2021-01-27 RX ADMIN — FENTANYL CITRATE 50 MCG: 50 INJECTION INTRAMUSCULAR; INTRAVENOUS at 12:36

## 2021-01-27 NOTE — ANESTHESIA PREPROCEDURE EVALUATION
Anesthesia Evaluation     Patient summary reviewed and Nursing notes reviewed   history of anesthetic complications: PONV               Airway   Mallampati: II  TM distance: >3 FB  Neck ROM: full  Dental      Pulmonary - negative pulmonary ROS   Cardiovascular     ECG reviewed  Rhythm: irregular    (+) hypertension, dysrhythmias PVC,       Neuro/Psych- negative ROS  GI/Hepatic/Renal/Endo    (+)  GERD,  renal disease ESRD,     Musculoskeletal (-) negative ROS    Abdominal    Substance History - negative use     OB/GYN negative ob/gyn ROS         Other                        Anesthesia Plan    ASA 3     general     intravenous induction     Anesthetic plan, all risks, benefits, and alternatives have been provided, discussed and informed consent has been obtained with: patient.

## 2021-01-27 NOTE — ANESTHESIA POSTPROCEDURE EVALUATION
Patient: Joelle Robison    Procedure Summary     Date: 01/27/21 Room / Location: [facility] OR  / [facility]IN OR    Anesthesia Start: 1148 Anesthesia Stop: 1300    Procedure: INSERTION PERITONEAL DIALYSIS CATHETER LAPAROSCOPIC OMENTOPEXY (N/A Abdomen) Diagnosis:       CKD (chronic kidney disease) stage 5, GFR less than 15 ml/min (CMS/HCC)      (CKD (chronic kidney disease) stage 5, GFR less than 15 ml/min (CMS/HCC) [N18.5])    Surgeon: Narciso Deleon MD Provider: David Milligan MD    Anesthesia Type: general ASA Status: 3          Anesthesia Type: general    Vitals  Vitals Value Taken Time   /82 01/27/21 1315   Temp     Pulse 85 01/27/21 1321   Resp     SpO2 97 % 01/27/21 1321   Vitals shown include unvalidated device data.        Anesthesia Post Evaluation

## 2021-01-27 NOTE — ANESTHESIA PROCEDURE NOTES
Airway  Urgency: elective    Date/Time: 1/27/2021 11:54 AM  Airway not difficult    General Information and Staff    Patient location during procedure: OR  Anesthesiologist: David Milligan MD  CRNA: Evita Srinivasan CRNA    Indications and Patient Condition  Indications for airway management: airway protection    Preoxygenated: yes  MILS maintained throughout  Mask difficulty assessment: 2 - vent by mask + OA or adjuvant +/- NMBA    Final Airway Details  Final airway type: endotracheal airway      Successful airway: ETT  Cuffed: yes   Successful intubation technique: direct laryngoscopy  Facilitating devices/methods: intubating stylet and cricoid pressure  Endotracheal tube insertion site: oral  Blade: Yara  Blade size: 3  ETT size (mm): 7.0  Cormack-Lehane Classification: grade I - full view of glottis  Placement verified by: chest auscultation and capnometry   Cuff volume (mL): 8  Measured from: teeth  ETT/EBT  to teeth (cm): 21  Number of attempts at approach: 1  Assessment: lips, teeth, and gum same as pre-op and atraumatic intubation

## 2021-01-27 NOTE — ANESTHESIA POSTPROCEDURE EVALUATION
Patient: Joelle Robison    Procedure Summary     Date: 01/27/21 Room / Location: [facility] OR  / [facility] MAIN OR    Anesthesia Start: 1148 Anesthesia Stop: 1300    Procedure: INSERTION PERITONEAL DIALYSIS CATHETER LAPAROSCOPIC OMENTOPEXY (N/A Abdomen) Diagnosis:       CKD (chronic kidney disease) stage 5, GFR less than 15 ml/min (CMS/HCC)      (CKD (chronic kidney disease) stage 5, GFR less than 15 ml/min (CMS/HCC) [N18.5])    Surgeon: Narciso Deleon MD Provider: David Milligan MD    Anesthesia Type: general ASA Status: 3          Anesthesia Type: general    Vitals  Vitals Value Taken Time   BP 97/62 01/27/21 1400   Temp 36.4 °C (97.6 °F) 01/27/21 1255   Pulse 86 01/27/21 1404   Resp 16 01/27/21 1330   SpO2 88 % 01/27/21 1404   Vitals shown include unvalidated device data.        Post Anesthesia Care and Evaluation    Patient location during evaluation: PACU  Patient participation: complete - patient participated  Level of consciousness: awake and alert  Pain management: adequate  Airway patency: patent  Anesthetic complications: No anesthetic complications    Cardiovascular status: acceptable  Respiratory status: acceptable  Hydration status: acceptable    Comments: --------------------            01/27/21               1330     --------------------   BP:       107/94     Pulse:      86       Resp:       16       Temp:                SpO2:      96%      Patient has cardiology and nephrology specialists following her and hs f/u appts with both in the near term that are addressing her kidney dysfunction/possible transplant candidacy and heart rhythm pathos  --------------------

## 2021-02-10 ENCOUNTER — OFFICE VISIT (OUTPATIENT)
Dept: SURGERY | Facility: CLINIC | Age: 51
End: 2021-02-10

## 2021-02-10 VITALS — BODY MASS INDEX: 27.38 KG/M2 | WEIGHT: 148.8 LBS | HEIGHT: 62 IN

## 2021-02-10 DIAGNOSIS — Z09 POSTOP CHECK: ICD-10-CM

## 2021-02-10 DIAGNOSIS — Z51.89 VISIT FOR WOUND CHECK: Primary | ICD-10-CM

## 2021-02-10 PROCEDURE — 99212 OFFICE O/P EST SF 10 MIN: CPT | Performed by: SURGERY

## 2021-02-10 NOTE — PROGRESS NOTES
CC: Postop check    S: This is a 50 y.o. female who presents for a post-operative visit after undergoing a Laparoscopic Peritoneal dialysis catheter placement and laparoscopic omentopexy on 1/27/2021.     Patient reports she is doing well. Eating well without any significant nausea. Having good bowel function. No problems with constipation or diarrhea. No urinary complaints. Denies fever. Ambulating well and slowly returning to normal activities. Has been following with the PD nurse and catheter will be flush on Friday    soft, nontender, nondistended, no masses or organomegaly  Incisions are healing well without any erythema or signs of infection. No signs of hernia. The catheter exit site is clean, dry and intact.      Assessment and plan:     The patient is a very pleasant 50 y.o. female s/p laparoscopic peritoneal dialysis catheter placement and omentopexy.  Patient is doing fine and does not have any specific complaints other than mild incisional pain.     - The patient was advised to continue to refrain from showering for 3 weeks but can have sponge bath  - continued to follow with the peritoneal dialysis catheter nurse for catheter flushes and training  -  can start using the catheter at 3 weeks from surgery      I advised the patient to continue to refrain from doing any heavy lifting of more than 15 pounds for a total of 6 weeks.  Patient may start doing an aerobic type exercise in 4 weeks after surgery.    Patient was given time to ask questions and all of them were answered appropriately.  The patient verbalized understanding and agreed with the plan.    she will follow-up at our office on a prn basis unless there are any problems.

## 2022-08-06 ENCOUNTER — APPOINTMENT (OUTPATIENT)
Dept: CT IMAGING | Facility: HOSPITAL | Age: 52
End: 2022-08-06

## 2022-08-06 ENCOUNTER — HOSPITAL ENCOUNTER (INPATIENT)
Facility: HOSPITAL | Age: 52
LOS: 6 days | Discharge: HOME | End: 2022-08-13
Attending: STUDENT IN AN ORGANIZED HEALTH CARE EDUCATION/TRAINING PROGRAM | Admitting: FAMILY MEDICINE

## 2022-08-06 ENCOUNTER — APPOINTMENT (OUTPATIENT)
Dept: GENERAL RADIOLOGY | Facility: HOSPITAL | Age: 52
End: 2022-08-06

## 2022-08-06 DIAGNOSIS — A04.72 CLOSTRIDIUM DIFFICILE DIARRHEA: Primary | ICD-10-CM

## 2022-08-06 DIAGNOSIS — N18.5 CKD (CHRONIC KIDNEY DISEASE) STAGE 5, GFR LESS THAN 15 ML/MIN: ICD-10-CM

## 2022-08-06 PROBLEM — A08.39 GASTROENTERITIS DUE TO COVID-19 VIRUS: Status: ACTIVE | Noted: 2022-08-06

## 2022-08-06 PROBLEM — N18.6 ESRD (END STAGE RENAL DISEASE) (HCC): Status: ACTIVE | Noted: 2022-08-06

## 2022-08-06 PROBLEM — U07.1 GASTROENTERITIS DUE TO COVID-19 VIRUS: Status: ACTIVE | Noted: 2022-08-06

## 2022-08-06 LAB
027 TOXIN: ABNORMAL
ALBUMIN SERPL-MCNC: 3.8 G/DL (ref 3.5–5.2)
ALBUMIN/GLOB SERPL: 1.3 G/DL
ALP SERPL-CCNC: 76 U/L (ref 39–117)
ALT SERPL W P-5'-P-CCNC: 13 U/L (ref 1–33)
ANION GAP SERPL CALCULATED.3IONS-SCNC: 26.2 MMOL/L (ref 5–15)
APPEARANCE FLD: CLEAR
AST SERPL-CCNC: 20 U/L (ref 1–32)
BASOPHILS # BLD AUTO: 0.01 10*3/MM3 (ref 0–0.2)
BASOPHILS NFR BLD AUTO: 0.2 % (ref 0–1.5)
BILIRUB SERPL-MCNC: 0.3 MG/DL (ref 0–1.2)
BUN SERPL-MCNC: 77 MG/DL (ref 6–20)
BUN/CREAT SERPL: 5.7 (ref 7–25)
C DIFF TOX GENS STL QL NAA+PROBE: POSITIVE
CALCIUM SPEC-SCNC: 8.6 MG/DL (ref 8.6–10.5)
CHLORIDE SERPL-SCNC: 100 MMOL/L (ref 98–107)
CO2 SERPL-SCNC: 11.8 MMOL/L (ref 22–29)
COLOR FLD: YELLOW
CREAT SERPL-MCNC: 13.46 MG/DL (ref 0.57–1)
D-LACTATE SERPL-SCNC: 0.8 MMOL/L (ref 0.5–2)
DEPRECATED RDW RBC AUTO: 47.8 FL (ref 37–54)
EGFRCR SERPLBLD CKD-EPI 2021: 3 ML/MIN/1.73
EOSINOPHIL # BLD AUTO: 0 10*3/MM3 (ref 0–0.4)
EOSINOPHIL NFR BLD AUTO: 0 % (ref 0.3–6.2)
ERYTHROCYTE [DISTWIDTH] IN BLOOD BY AUTOMATED COUNT: 13 % (ref 12.3–15.4)
GLOBULIN UR ELPH-MCNC: 3 GM/DL
GLUCOSE SERPL-MCNC: 96 MG/DL (ref 65–99)
HCT VFR BLD AUTO: 23.7 % (ref 34–46.6)
HGB BLD-MCNC: 7.9 G/DL (ref 12–15.9)
IMM GRANULOCYTES # BLD AUTO: 0.02 10*3/MM3 (ref 0–0.05)
IMM GRANULOCYTES NFR BLD AUTO: 0.5 % (ref 0–0.5)
LIPASE SERPL-CCNC: 181 U/L (ref 13–60)
LYMPHOCYTES # BLD AUTO: 0.88 10*3/MM3 (ref 0.7–3.1)
LYMPHOCYTES NFR BLD AUTO: 21.1 % (ref 19.6–45.3)
LYMPHOCYTES NFR FLD MANUAL: 18 %
MACROPHAGE FLUID: 30 %
MAGNESIUM SERPL-MCNC: 1.3 MG/DL (ref 1.6–2.6)
MCH RBC QN AUTO: 33.8 PG (ref 26.6–33)
MCHC RBC AUTO-ENTMCNC: 33.3 G/DL (ref 31.5–35.7)
MCV RBC AUTO: 101.3 FL (ref 79–97)
MESOTHL CELL NFR FLD MANUAL: 1 %
MONOCYTES # BLD AUTO: 0.27 10*3/MM3 (ref 0.1–0.9)
MONOCYTES NFR BLD AUTO: 6.5 % (ref 5–12)
MONOCYTES NFR FLD: 47 %
MRSA DNA SPEC QL NAA+PROBE: ABNORMAL
NEUTROPHILS NFR BLD AUTO: 2.99 10*3/MM3 (ref 1.7–7)
NEUTROPHILS NFR BLD AUTO: 71.7 % (ref 42.7–76)
NEUTROPHILS NFR FLD MANUAL: 4 %
NRBC BLD AUTO-RTO: 0 /100 WBC (ref 0–0.2)
NUC CELL # FLD: 213 /MM3
PLATELET # BLD AUTO: 90 10*3/MM3 (ref 140–450)
PMV BLD AUTO: 10.5 FL (ref 6–12)
POTASSIUM SERPL-SCNC: 5 MMOL/L (ref 3.5–5.2)
PROCALCITONIN SERPL-MCNC: 16.13 NG/ML (ref 0–0.25)
PROT SERPL-MCNC: 6.8 G/DL (ref 6–8.5)
RBC # BLD AUTO: 2.34 10*6/MM3 (ref 3.77–5.28)
RBC # FLD AUTO: <2000 /MM3
SODIUM SERPL-SCNC: 138 MMOL/L (ref 136–145)
WBC NRBC COR # BLD: 4.17 10*3/MM3 (ref 3.4–10.8)

## 2022-08-06 PROCEDURE — 71250 CT THORAX DX C-: CPT

## 2022-08-06 PROCEDURE — 84145 PROCALCITONIN (PCT): CPT | Performed by: INTERNAL MEDICINE

## 2022-08-06 PROCEDURE — 74176 CT ABD & PELVIS W/O CONTRAST: CPT

## 2022-08-06 PROCEDURE — 25010000002 VANCOMYCIN 5 G RECONSTITUTED SOLUTION: Performed by: INTERNAL MEDICINE

## 2022-08-06 PROCEDURE — 87493 C DIFF AMPLIFIED PROBE: CPT | Performed by: INTERNAL MEDICINE

## 2022-08-06 PROCEDURE — 83605 ASSAY OF LACTIC ACID: CPT | Performed by: INTERNAL MEDICINE

## 2022-08-06 PROCEDURE — 85025 COMPLETE CBC W/AUTO DIFF WBC: CPT | Performed by: INTERNAL MEDICINE

## 2022-08-06 PROCEDURE — 99222 1ST HOSP IP/OBS MODERATE 55: CPT | Performed by: INTERNAL MEDICINE

## 2022-08-06 PROCEDURE — 87040 BLOOD CULTURE FOR BACTERIA: CPT | Performed by: INTERNAL MEDICINE

## 2022-08-06 PROCEDURE — 83690 ASSAY OF LIPASE: CPT | Performed by: INTERNAL MEDICINE

## 2022-08-06 PROCEDURE — G0378 HOSPITAL OBSERVATION PER HR: HCPCS

## 2022-08-06 PROCEDURE — 87505 NFCT AGENT DETECTION GI: CPT | Performed by: INTERNAL MEDICINE

## 2022-08-06 PROCEDURE — 87641 MR-STAPH DNA AMP PROBE: CPT | Performed by: INTERNAL MEDICINE

## 2022-08-06 PROCEDURE — 3E1M39Z IRRIGATION OF PERITONEAL CAVITY USING DIALYSATE, PERCUTANEOUS APPROACH: ICD-10-PCS | Performed by: INTERNAL MEDICINE

## 2022-08-06 PROCEDURE — 87205 SMEAR GRAM STAIN: CPT | Performed by: INTERNAL MEDICINE

## 2022-08-06 PROCEDURE — 80053 COMPREHEN METABOLIC PANEL: CPT | Performed by: INTERNAL MEDICINE

## 2022-08-06 PROCEDURE — 25010000002 PIPERACILLIN SOD-TAZOBACTAM PER 1 G: Performed by: INTERNAL MEDICINE

## 2022-08-06 PROCEDURE — 87070 CULTURE OTHR SPECIMN AEROBIC: CPT | Performed by: INTERNAL MEDICINE

## 2022-08-06 PROCEDURE — 71045 X-RAY EXAM CHEST 1 VIEW: CPT

## 2022-08-06 PROCEDURE — 25010000002 MAGNESIUM SULFATE 2 GM/50ML SOLUTION: Performed by: INTERNAL MEDICINE

## 2022-08-06 PROCEDURE — 89051 BODY FLUID CELL COUNT: CPT | Performed by: INTERNAL MEDICINE

## 2022-08-06 PROCEDURE — 83735 ASSAY OF MAGNESIUM: CPT | Performed by: INTERNAL MEDICINE

## 2022-08-06 RX ORDER — ACETAMINOPHEN 160 MG/5ML
650 SOLUTION ORAL EVERY 4 HOURS PRN
Status: DISCONTINUED | OUTPATIENT
Start: 2022-08-06 | End: 2022-08-13 | Stop reason: HOSPADM

## 2022-08-06 RX ORDER — ACETAMINOPHEN 325 MG/1
650 TABLET ORAL EVERY 4 HOURS PRN
Status: DISCONTINUED | OUTPATIENT
Start: 2022-08-06 | End: 2022-08-13 | Stop reason: HOSPADM

## 2022-08-06 RX ORDER — SODIUM CHLORIDE 0.9 % (FLUSH) 0.9 %
10 SYRINGE (ML) INJECTION AS NEEDED
Status: DISCONTINUED | OUTPATIENT
Start: 2022-08-06 | End: 2022-08-13 | Stop reason: HOSPADM

## 2022-08-06 RX ORDER — CHOLECALCIFEROL (VITAMIN D3) 125 MCG
5 CAPSULE ORAL NIGHTLY PRN
Status: DISCONTINUED | OUTPATIENT
Start: 2022-08-06 | End: 2022-08-13 | Stop reason: HOSPADM

## 2022-08-06 RX ORDER — OXYCODONE AND ACETAMINOPHEN 10; 325 MG/1; MG/1
1 TABLET ORAL EVERY 8 HOURS PRN
Status: DISPENSED | OUTPATIENT
Start: 2022-08-06 | End: 2022-08-13

## 2022-08-06 RX ORDER — FAMOTIDINE 10 MG
10 TABLET ORAL NIGHTLY
Status: DISCONTINUED | OUTPATIENT
Start: 2022-08-06 | End: 2022-08-13 | Stop reason: HOSPADM

## 2022-08-06 RX ORDER — ONDANSETRON 2 MG/ML
4 INJECTION INTRAMUSCULAR; INTRAVENOUS EVERY 6 HOURS PRN
Status: DISCONTINUED | OUTPATIENT
Start: 2022-08-06 | End: 2022-08-13 | Stop reason: HOSPADM

## 2022-08-06 RX ORDER — SODIUM CHLORIDE 9 MG/ML
40 INJECTION, SOLUTION INTRAVENOUS AS NEEDED
Status: DISCONTINUED | OUTPATIENT
Start: 2022-08-06 | End: 2022-08-13 | Stop reason: HOSPADM

## 2022-08-06 RX ORDER — SODIUM BICARBONATE 650 MG/1
1300 TABLET ORAL 3 TIMES DAILY
Status: DISCONTINUED | OUTPATIENT
Start: 2022-08-06 | End: 2022-08-11

## 2022-08-06 RX ORDER — KETOROLAC TROMETHAMINE 15 MG/ML
15 INJECTION, SOLUTION INTRAMUSCULAR; INTRAVENOUS EVERY 6 HOURS PRN
Status: DISCONTINUED | OUTPATIENT
Start: 2022-08-06 | End: 2022-08-06

## 2022-08-06 RX ORDER — PANTOPRAZOLE SODIUM 40 MG/10ML
40 INJECTION, POWDER, LYOPHILIZED, FOR SOLUTION INTRAVENOUS EVERY 12 HOURS SCHEDULED
Status: DISCONTINUED | OUTPATIENT
Start: 2022-08-06 | End: 2022-08-06

## 2022-08-06 RX ORDER — ALLOPURINOL 100 MG/1
100 TABLET ORAL DAILY
COMMUNITY

## 2022-08-06 RX ORDER — ACETAMINOPHEN 650 MG/1
650 SUPPOSITORY RECTAL EVERY 4 HOURS PRN
Status: DISCONTINUED | OUTPATIENT
Start: 2022-08-06 | End: 2022-08-13 | Stop reason: HOSPADM

## 2022-08-06 RX ORDER — MAGNESIUM SULFATE HEPTAHYDRATE 40 MG/ML
2 INJECTION, SOLUTION INTRAVENOUS ONCE
Status: COMPLETED | OUTPATIENT
Start: 2022-08-06 | End: 2022-08-06

## 2022-08-06 RX ORDER — SODIUM CHLORIDE 0.9 % (FLUSH) 0.9 %
10 SYRINGE (ML) INJECTION EVERY 12 HOURS SCHEDULED
Status: DISCONTINUED | OUTPATIENT
Start: 2022-08-06 | End: 2022-08-13 | Stop reason: HOSPADM

## 2022-08-06 RX ORDER — SODIUM CHLORIDE, SODIUM LACTATE, CALCIUM CHLORIDE, MAGNESIUM CHLORIDE AND DEXTROSE 1.5; 538; 448; 18.3; 5.08 G/100ML; MG/100ML; MG/100ML; MG/100ML; MG/100ML
1750 INJECTION, SOLUTION INTRAPERITONEAL
Status: DISCONTINUED | OUTPATIENT
Start: 2022-08-06 | End: 2022-08-13 | Stop reason: HOSPADM

## 2022-08-06 RX ORDER — ALUMINA, MAGNESIA, AND SIMETHICONE 2400; 2400; 240 MG/30ML; MG/30ML; MG/30ML
15 SUSPENSION ORAL EVERY 6 HOURS PRN
Status: DISCONTINUED | OUTPATIENT
Start: 2022-08-06 | End: 2022-08-13 | Stop reason: HOSPADM

## 2022-08-06 RX ORDER — GENTAMICIN SULFATE 1 MG/G
1 OINTMENT TOPICAL DAILY
Status: DISCONTINUED | OUTPATIENT
Start: 2022-08-06 | End: 2022-08-13 | Stop reason: HOSPADM

## 2022-08-06 RX ORDER — VANCOMYCIN HYDROCHLORIDE 125 MG/1
125 CAPSULE ORAL EVERY 6 HOURS SCHEDULED
Status: DISCONTINUED | OUTPATIENT
Start: 2022-08-06 | End: 2022-08-13 | Stop reason: HOSPADM

## 2022-08-06 RX ORDER — NALOXONE HCL 0.4 MG/ML
0.4 VIAL (ML) INJECTION
Status: DISCONTINUED | OUTPATIENT
Start: 2022-08-06 | End: 2022-08-13 | Stop reason: HOSPADM

## 2022-08-06 RX ORDER — CALCIUM ACETATE 667 MG/1
1334 CAPSULE ORAL
Status: DISCONTINUED | OUTPATIENT
Start: 2022-08-06 | End: 2022-08-06

## 2022-08-06 RX ORDER — FAMOTIDINE 20 MG/1
20 TABLET, FILM COATED ORAL NIGHTLY
Status: DISCONTINUED | OUTPATIENT
Start: 2022-08-06 | End: 2022-08-06 | Stop reason: DRUGHIGH

## 2022-08-06 RX ADMIN — FAMOTIDINE 10 MG: 10 TABLET ORAL at 22:21

## 2022-08-06 RX ADMIN — SODIUM BICARBONATE 650 MG TABLET 1300 MG: at 22:21

## 2022-08-06 RX ADMIN — Medication 10 ML: at 10:21

## 2022-08-06 RX ADMIN — TAZOBACTAM SODIUM AND PIPERACILLIN SODIUM 3.38 G: 375; 3 INJECTION, SOLUTION INTRAVENOUS at 13:18

## 2022-08-06 RX ADMIN — VANCOMYCIN HYDROCHLORIDE 1500 MG: 5 INJECTION, POWDER, LYOPHILIZED, FOR SOLUTION INTRAVENOUS at 14:35

## 2022-08-06 RX ADMIN — GENTAMICIN SULFATE 1 APPLICATION: 1 OINTMENT TOPICAL at 17:18

## 2022-08-06 RX ADMIN — SODIUM CHLORIDE, SODIUM LACTATE, CALCIUM CHLORIDE, MAGNESIUM CHLORIDE AND DEXTROSE 1500 ML: 1.5; 538; 448; 18.3; 5.08 INJECTION, SOLUTION INTRAPERITONEAL at 22:20

## 2022-08-06 RX ADMIN — SODIUM BICARBONATE 100 MEQ: 84 INJECTION, SOLUTION INTRAVENOUS at 14:05

## 2022-08-06 RX ADMIN — MAGNESIUM SULFATE HEPTAHYDRATE 2 G: 40 INJECTION, SOLUTION INTRAVENOUS at 17:07

## 2022-08-06 RX ADMIN — PANTOPRAZOLE SODIUM 40 MG: 40 INJECTION, POWDER, FOR SOLUTION INTRAVENOUS at 10:23

## 2022-08-06 RX ADMIN — VANCOMYCIN HYDROCHLORIDE 125 MG: 125 CAPSULE ORAL at 17:18

## 2022-08-06 RX ADMIN — SODIUM BICARBONATE 650 MG TABLET 1300 MG: at 17:10

## 2022-08-06 NOTE — CONSULTS
[facility]   Nephrology Consult Note      Patient Name: Joelle Robison  : 1970  MRN: [id number]  Primary Care Physician:  Refugio Shen MD  Referring Physician: Aden Rudolph MD  Date of admission: 2022    Subjective   Subjective     Reason for Consult/ Chief Complaint: ESRD on peritoneal dialysis.    HPI:  Joelle Robison is a 52 y.o. female with history of end-stage renal disease secondary to PKD has been on peritoneal dialysis for a while, in [place] under the care of Dr. Leung..  Last week she went to [place] with family and got sick there.  She was diagnosed with COVID after she returned last Saturday.  She is vaccinated with 2 shots but has not received her booster.  She presented to the ER in [place] with persistent diarrhea, abdominal pain, nausea as well as worsening shortness of breath.  Did have fever and chills.  Has not been able to keep medications down.  Was transferred to [facility] for further management.  Her blood pressure is marginal.  She is markedly acidotic as well.  She had some abdominal pain but not today.  She tells me that her peritoneal fluid has been clear.  She has cardiomyopathy with I believe pulmonary hypertension that prevented her from being active on transplant list though she still being worked up for that.    Review of Systems   All systems were reviewed and negative except for: What is mentioned above.    Personal History     Past Medical History:   Diagnosis Date   • Anemia    • Cardiomyopathy (CMS/HCC)    • Chronic systolic heart failure (CMS/HCC)    • CKD (chronic kidney disease)    • GERD (gastroesophageal reflux disease)    • Heart disease    • High blood pressure    • Polycystic kidney disease    • PONV (postoperative nausea and vomiting)        Past Surgical History:   Procedure Laterality Date   • APPENDECTOMY N/A    • INSERTION PERITONEAL DIALYSIS CATHETER N/A 2021    Procedure: INSERTION PERITONEAL  DIALYSIS CATHETER LAPAROSCOPIC OMENTOPEXY;  Surgeon: Narciso Deleon MD;  Location: [facility] OR;  Service: General;  Laterality: N/A;       Family History: family history includes Kidney cancer in her mother. Otherwise pertinent FHx was reviewed and not pertinent to current issue.    Social History:  reports that she quit smoking about 2 years ago. She has a 25.00 pack-year smoking history. She has never used smokeless tobacco. She reports current alcohol use. She reports that she does not use drugs.    Home Medications:  NON FORMULARY, calcium acetate, carvedilol, furosemide, omeprazole, sodium bicarbonate, and spironolactone    Allergies:  Allergies   Allergen Reactions   • Codeine GI Intolerance   • Morphine Other (See Comments)     PT STATES HAS NEVER HAD ANY REACTION TO THIS, HER MOM HAD PAIN PUMP WITH MORPHINE AND SEVERE N/V. PT DOES NOT WANT MORPHINE GIVEN TO HER       Objective    Objective     Vitals:   Temp:  [97.16 °F (36.2 °C)-99.6 °F (37.6 °C)] 99 °F (37.2 °C)  Heart Rate:  [84-96] 88  Resp:  [20] 20  BP: ()/(59-76) 94/59     Physical Exam    Constitutional: Awake, alert, not in distress, uncomfortable.   Eyes: sclerae anicteric, no conjunctival injection, pale.   HENT: mucous membranes moist   Neck: Supple, no thyromegaly, no lymphadenopathy, trachea midline, No JVD   Respiratory: Decreased to auscultation bilaterally, nonlabored respirations    Cardiovascular: RRR, no murmurs, rubs, or gallops.   Gastrointestinal: Positive bowel sounds, soft, nontender, nondistended PD catheter exit site is clean and covered.   Musculoskeletal: No edema, no clubbing or cyanosis.   Psychiatric: Appropriate affect, cooperative   Neurologic: Oriented x 3, moving all extremities, Cranial Nerves grossly intact, speech clear   Skin: warm and dry, no rashes     Result Review    Result Reviewed:  I have personally reviewed the results from the time of this admission to 8/6/2022 16:29 EDT and agree with  these findings:  [x]  Laboratory  []  Microbiology  [x]  Radiology  []  EKG/Telemetry   []  Cardiology/Vascular   []  Pathology  []  Old records  []  Other:  Lab Results   Component Value Date    GLUCOSE 96 08/06/2022    CALCIUM 8.6 08/06/2022     08/06/2022    K 5.0 08/06/2022    CO2 11.8 (L) 08/06/2022     08/06/2022    BUN 77 (H) 08/06/2022    CREATININE 13.46 (H) 08/06/2022    EGFRIFAFRI  01/25/2021      Comment:      <15 Indicative of kidney failure.    EGFRIFNONA 6 (L) 01/25/2021    BCR 5.7 (L) 08/06/2022    ANIONGAP 26.2 (H) 08/06/2022      Lab Results   Component Value Date    CALCIUM 8.6 08/06/2022     Results from last 7 days   Lab Units 08/06/22  1106   MAGNESIUM mg/dL 1.3*          Most notable findings include: Bicarb 11.8.  Magnesium 1.3.  Lipase 181.    Assessment & Plan   Assessment / Plan     Brief Patient Summary:  Joelle Robison is a 52 y.o. female who has PKD and end-stage renal disease on peritoneal dialysis.  Here after COVID with persistent diarrhea and shortness of breath.    Active Hospital Problems:  Active Hospital Problems    Diagnosis    • ESRD (end stage renal disease) (HCC)    • Gastroenteritis due to COVID-19 virus        Assessment and Plan:   - End-stage renal disease, on peritoneal dialysis, normally using cycler at home.  Will use manual exchanges 1750 mL x 5 daily.  Peritoneal fluid specimen submitted for culture.  We will add cell count.  I called and discussed with the lab.  Her volume status is adequate.  Agree with current IV fluid but may DC in a.m.  Abdomen and pelvis CT scan without contrast in process.  - History of polycystic kidney disease.  - Metabolic acidosis secondary to renal failure and diarrhea.  We will add p.o. sodium bicarb.  Getting bicarbonate IV.  - Hypomagnesemia, being replaced.  Recheck in AM.  - Macrocytic anemia, .  Will check iron studies and B12/folate in AM.  - COVID-positive.  - Abdominal pain, etiology is uncertain, seems  better now.  Lipase mildly elevated.  Empirically on Zosyn and vancomycin.  Will check urinalysis if patient is able to provide a sample.  - Cardiomyopathy may explain relative hypotension.  Discussed with primary and nursing staff.  Will follow.    Thank you very much for this consult!    Electronically signed by Evelyne Mojica MD, 08/06/22, 4:29 PM EDT.

## 2022-08-06 NOTE — PLAN OF CARE
Problem: Adult Inpatient Plan of Care  Goal: Plan of Care Review  Outcome: Ongoing, Progressing  Flowsheets (Taken 8/6/2022 1504)  Progress: no change  Plan of Care Reviewed With: patient  Outcome Evaluation: PT CAME FROM 4TH FLOOR AROUND 1400. STARTED PT SODIUM BICARB AND ANTIBITOTICS. PT STATES HER NAUSEA IS BETTER BUT STILL HAVING ALOT OF DIARHEA. NO PRN HAVE BEEN GIVEN AT THIS TIME. WILL GIVE MAGNESIUM AFTER ANTIBITOICS ARE FINISHED.  Goal: Patient-Specific Goal (Individualized)  Outcome: Ongoing, Progressing  Goal: Absence of Hospital-Acquired Illness or Injury  Outcome: Ongoing, Progressing  Intervention: Identify and Manage Fall Risk  Recent Flowsheet Documentation  Taken 8/6/2022 1410 by Shola Hall RN  Safety Promotion/Fall Prevention: safety round/check completed  Intervention: Prevent Infection  Recent Flowsheet Documentation  Taken 8/6/2022 1410 by Shola Hall RN  Infection Prevention:   hand hygiene promoted   personal protective equipment utilized  Goal: Optimal Comfort and Wellbeing  Outcome: Ongoing, Progressing  Goal: Readiness for Transition of Care  Outcome: Ongoing, Progressing     Problem: Diarrhea  Goal: Fluid and Electrolyte Balance  Outcome: Ongoing, Progressing  Intervention: Manage Diarrhea  Recent Flowsheet Documentation  Taken 8/6/2022 1410 by Shola Hall RN  Isolation Precautions:   precautions maintained   airborne   spore   Goal Outcome Evaluation:  Plan of Care Reviewed With: patient        Progress: no change  Outcome Evaluation: PT CAME FROM 4TH FLOOR AROUND 1400. STARTED PT SODIUM BICARB AND ANTIBITOTICS. PT STATES HER NAUSEA IS BETTER BUT STILL HAVING ALOT OF DIARHEA. NO PRN HAVE BEEN GIVEN AT THIS TIME. WILL GIVE MAGNESIUM AFTER ANTIBITOICS ARE FINISHED.

## 2022-08-06 NOTE — H&P
[facility] HISTORY AND PHYSICAL  Date: 2022   Patient Name: Joelle Robison  : 1970  MRN: [id number]  Primary Care Physician:  Refugio Shen MD  Date of admission: 2022    Subjective   Subjective     Chief Complaint: Vomiting and diarrhea for past few days.    HPI:    Joelle Robison is a 52 y.o. female with past medical history of end-stage renal disease due to polycystic kidney disease on home peritoneal dialysis by Dr. Leung, anemia of chronic kidney disease, hypertension and chronic systolic heart failure.  Patient recently came back from [place] about a week ago and contracted COVID since then.  Patient initially started with fever and chills.  Later on developed diarrhea and vomiting.  She is having frequent watery stools without any blood in it multiple times a day.  Also multiple episodes of nonbloody vomiting not able to keep anything down with decreased appetite.  Does have some nausea.  Her last vomiting was yesterday.  Last diarrhea was today..  She has mild right lower quadrant abdominal pain.  Patient has not able to peritoneal dialysis for past 2 days.  Patient has not been able to do her peritoneal dialysis for past 2 days because of this reason  Patient denies any chest pain, shortness of breath, cough, loss of taste or smell.  Patient presented to outside facility and was transferred to our hospital because of her peritoneal dialysis care.  She denies any urinary symptoms.  Denies any dizziness.  Patient denies any possible bad food exposure or recent use of antibiotics.  No other family member sick with vomiting or diarrhea.  Patient has not noticed any cloudiness in her peritoneal fluid.    Personal History     Past Medical History:  Past Medical History:   Diagnosis Date   • Anemia    • Cardiomyopathy (CMS/HCC)    • Chronic systolic heart failure (CMS/HCC)    • CKD (chronic kidney disease)    • GERD (gastroesophageal reflux disease)    • Heart disease     • High blood pressure    • Polycystic kidney disease    • PONV (postoperative nausea and vomiting)        Past Surgical History:  Past Surgical History:   Procedure Laterality Date   • APPENDECTOMY N/A 1974   • INSERTION PERITONEAL DIALYSIS CATHETER N/A 1/27/2021    Procedure: INSERTION PERITONEAL DIALYSIS CATHETER LAPAROSCOPIC OMENTOPEXY;  Surgeon: Narciso Deleon MD;  Location: [facility] OR;  Service: General;  Laterality: N/A;       Family History:   family history includes Kidney cancer in her mother.    Social History:    reports that she quit smoking about 2 years ago. She has a 25.00 pack-year smoking history. She has never used smokeless tobacco. She reports current alcohol use. She reports that she does not use drugs.    Home Medications:  NON FORMULARY, calcium acetate, carvedilol, furosemide, omeprazole, sodium bicarbonate, and spironolactone    Allergies:  Allergies   Allergen Reactions   • Codeine GI Intolerance   • Morphine Other (See Comments)     PT STATES HAS NEVER HAD ANY REACTION TO THIS, HER MOM HAD PAIN PUMP WITH MORPHINE AND SEVERE N/V. PT DOES NOT WANT MORPHINE GIVEN TO HER       Review of Systems   All systems were reviewed and negative except for: H&P    Objective   Objective     Vitals:   Temp:  [97.16 °F (36.2 °C)-99.6 °F (37.6 °C)] 99 °F (37.2 °C)  Heart Rate:  [84-96] 88  Resp:  [20] 20  BP: ()/(59-76) 94/59    Physical Exam    Constitutional: Awake, alert, no acute distress   Eyes: Pupils equal, sclerae anicteric, no conjunctival injection   HENT: NCAT, mucous membranes moist   Neck: Supple, no thyromegaly, no lymphadenopathy, trachea midline   Respiratory: Clear to auscultation bilaterally, nonlabored respirations    Cardiovascular: RRR, no murmurs, rubs, or gallops, palpable pedal pulses bilaterally   Gastrointestinal: Positive bowel sounds, soft, nontender, distended.  Minimally tender right lower quadrant no rebound guarding .  Peritoneal dialysis catheter in  place in left mid abdomen no drainage, redness or pus at insertion site   Musculoskeletal: No bilateral ankle edema, no clubbing or cyanosis to extremities   Psychiatric: Appropriate affect, cooperative   Neurologic: Oriented x 3, strength symmetric in all extremities, Cranial Nerves grossly intact to confrontation, speech clear   Skin: No rashes     Result Review    Result Review:  I have personally reviewed the results from the time of this admission to 8/6/2022 16:10 EDT and agree with these findings:  [x]  Laboratory  []  Microbiology  []  Radiology  []  EKG/Telemetry   []  Cardiology/Vascular   []  Pathology  [x]  Old records  [x]  Other: Medications      Assessment & Plan   Assessment / Plan     Assessment:  COVID infection without hypoxemia  Gastroenteritis.  COVID versus bacterial.  Rule out C. difficile.  End-stage renal disease on peritoneal dialysis.  Missed dialysis for 2 days.  Polycystic kidney disease causing above.  Anemia of chronic kidney disease.  Systolic CHF.  Stable.  Hypertension.  Anion gap acidosis.  Hypomagnesemia.  Thrombocytopenia    Plan:   Admit to flex monitor.  No treatment needed for COVID infection.  Patient has symptoms for 7 days and is not hypoxemic.  Start on bicarb drip.  Check stool for C. difficile and enteric bacterial panel.  IV Vanco and Zosyn.  As needed Imodium.  Discussed with nephrology.  Appreciate input.  Check peritoneal fluid culture and gram stain.  Resume appropriate home medication once list is updated.  Check CT of the chest and abdomen pelvis.  Replace magnesium.  Trend procalcitonin.  Discussed with nursing staff         DVT prophylaxis:  Mechanical DVT prophylaxis orders are present.     CODE STATUS:    Code Status (Patient has no pulse and is not breathing): CPR (Attempt to Resuscitate)  Medical Interventions (Patient has pulse or is breathing): Full Support      Admission Status:  I believe this patient meets inpatient status.    Part of this note may be  an electronic transcription/translation of spoken language to printed text using the Dragon Dictation System.     Electronically signed by Abbe Li MD, 08/06/22, 4:10 PM EDT.

## 2022-08-06 NOTE — PROGRESS NOTES
This is a 51 yo female with a PMH of HTN and ESRD 2/2 polycystic kidney disease presenting with complaints of nausea and diarrhea. She currently lives at home and is on peritoneal dialysis since 2021. Pt was recently diagnosed with Covid 19 and has been having worsening nausea and diarrhea for the past two days. Pt is also complaining of weakness and abdominal pain and has not been compliant with her dialysis due to her symptoms. She initially presented to [facility] and was found to have a non-gap metabolic acidosis for which she was started on Bicarb drip. She is now being transferred to our hospital for further management.

## 2022-08-06 NOTE — PROGRESS NOTES
"Pharmacy to Dose Vancomycin Day: 1  Consulting Provider: DANITZA Li  Clinical Indication: PNA  Pertinent Past Medical History: ESRD on PD. COVID+  Goal Trough: 15 - 20 mcg/mL  Duration of therapy: 7 days     157.5 cm (62\")       08/06/22  0814      Weight: 72.5 kg (159 lb 13.3 oz)         Lab Results   Component Value Date    CREATININE 13.46 (H) 08/06/2022      Estimated Creatinine Clearance: 4.5 mL/min (A) (by C-G formula based on SCr of 13.46 mg/dL (H)).   HD/PD/CRRT?: PD  Lab Results   Component Value Date    WBC 4.17 08/06/2022         Contrast Administered: No     Relevant Micro:   Microbiology Results (last 10 days)       ** No results found for the last 240 hours. **             Relevant Radiology:   XR 8/6: IMPRESSION:  Moderate patchy airspace opacity throughout the lung fields consistent with multifocal   pneumonia/COVID-19 infection.    Other Antimicrobial Therapy: Pip/Tazo 3.375g IV q12h     Assessment/Plan  Loading dose: 1500mg   Regimen: pulse dose     Note: Vanc random in AM. MRSA PCR ordered, not yet collected.     Level due: 8/7  Labs ordered: Vanc random. Renal panel in AM.     "

## 2022-08-07 PROBLEM — A04.72 CLOSTRIDIUM DIFFICILE DIARRHEA: Status: ACTIVE | Noted: 2022-08-07

## 2022-08-07 LAB
ALBUMIN SERPL-MCNC: 3.6 G/DL (ref 3.5–5.2)
ANION GAP SERPL CALCULATED.3IONS-SCNC: 20.5 MMOL/L (ref 5–15)
BASOPHILS # BLD AUTO: 0.01 10*3/MM3 (ref 0–0.2)
BASOPHILS NFR BLD AUTO: 0.2 % (ref 0–1.5)
BUN SERPL-MCNC: 76 MG/DL (ref 6–20)
BUN/CREAT SERPL: 6 (ref 7–25)
C COLI+JEJ+UPSA DNA STL QL NAA+NON-PROBE: NOT DETECTED
CALCIUM SPEC-SCNC: 8.6 MG/DL (ref 8.6–10.5)
CHLORIDE SERPL-SCNC: 97 MMOL/L (ref 98–107)
CO2 SERPL-SCNC: 19.5 MMOL/L (ref 22–29)
CREAT SERPL-MCNC: 12.61 MG/DL (ref 0.57–1)
DEPRECATED RDW RBC AUTO: 48 FL (ref 37–54)
EC STX1+STX2 GENES STL QL NAA+NON-PROBE: NOT DETECTED
EGFRCR SERPLBLD CKD-EPI 2021: 3.2 ML/MIN/1.73
EOSINOPHIL # BLD AUTO: 0.01 10*3/MM3 (ref 0–0.4)
EOSINOPHIL NFR BLD AUTO: 0.2 % (ref 0.3–6.2)
ERYTHROCYTE [DISTWIDTH] IN BLOOD BY AUTOMATED COUNT: 12.8 % (ref 12.3–15.4)
FERRITIN SERPL-MCNC: 4863 NG/ML (ref 13–150)
FOLATE SERPL-MCNC: >20 NG/ML (ref 4.78–24.2)
GLUCOSE SERPL-MCNC: 114 MG/DL (ref 65–99)
HCT VFR BLD AUTO: 22.5 % (ref 34–46.6)
HGB BLD-MCNC: 7.5 G/DL (ref 12–15.9)
IMM GRANULOCYTES # BLD AUTO: 0.02 10*3/MM3 (ref 0–0.05)
IMM GRANULOCYTES NFR BLD AUTO: 0.5 % (ref 0–0.5)
IRON 24H UR-MRATE: 30 MCG/DL (ref 37–145)
IRON SATN MFR SERPL: 16 % (ref 20–50)
LYMPHOCYTES # BLD AUTO: 0.82 10*3/MM3 (ref 0.7–3.1)
LYMPHOCYTES NFR BLD AUTO: 19.3 % (ref 19.6–45.3)
MAGNESIUM SERPL-MCNC: 1.9 MG/DL (ref 1.6–2.6)
MCH RBC QN AUTO: 34.1 PG (ref 26.6–33)
MCHC RBC AUTO-ENTMCNC: 33.3 G/DL (ref 31.5–35.7)
MCV RBC AUTO: 102.3 FL (ref 79–97)
MONOCYTES # BLD AUTO: 0.22 10*3/MM3 (ref 0.1–0.9)
MONOCYTES NFR BLD AUTO: 5.2 % (ref 5–12)
NEUTROPHILS NFR BLD AUTO: 3.17 10*3/MM3 (ref 1.7–7)
NEUTROPHILS NFR BLD AUTO: 74.6 % (ref 42.7–76)
NRBC BLD AUTO-RTO: 0 /100 WBC (ref 0–0.2)
PHOSPHATE SERPL-MCNC: 8 MG/DL (ref 2.5–4.5)
PLATELET # BLD AUTO: 90 10*3/MM3 (ref 140–450)
PMV BLD AUTO: 10.3 FL (ref 6–12)
POTASSIUM SERPL-SCNC: 4.3 MMOL/L (ref 3.5–5.2)
PROCALCITONIN SERPL-MCNC: 20.98 NG/ML (ref 0–0.25)
RBC # BLD AUTO: 2.2 10*6/MM3 (ref 3.77–5.28)
S ENT+BONG DNA STL QL NAA+NON-PROBE: NOT DETECTED
SHIGELLA SP+EIEC IPAH ST NAA+NON-PROBE: NOT DETECTED
SODIUM SERPL-SCNC: 137 MMOL/L (ref 136–145)
TIBC SERPL-MCNC: 191 MCG/DL (ref 298–536)
TRANSFERRIN SERPL-MCNC: 128 MG/DL (ref 200–360)
VANCOMYCIN SERPL-MCNC: 22.13 MCG/ML (ref 5–40)
VIT B12 BLD-MCNC: >2000 PG/ML (ref 211–946)
WBC NRBC COR # BLD: 4.25 10*3/MM3 (ref 3.4–10.8)

## 2022-08-07 PROCEDURE — 63710000001 DIPHENHYDRAMINE PER 50 MG: Performed by: INTERNAL MEDICINE

## 2022-08-07 PROCEDURE — 80202 ASSAY OF VANCOMYCIN: CPT | Performed by: INTERNAL MEDICINE

## 2022-08-07 PROCEDURE — 83540 ASSAY OF IRON: CPT | Performed by: INTERNAL MEDICINE

## 2022-08-07 PROCEDURE — 82607 VITAMIN B-12: CPT | Performed by: INTERNAL MEDICINE

## 2022-08-07 PROCEDURE — 25010000002 EPOETIN ALFA PER 1000 UNITS: Performed by: INTERNAL MEDICINE

## 2022-08-07 PROCEDURE — 84145 PROCALCITONIN (PCT): CPT | Performed by: INTERNAL MEDICINE

## 2022-08-07 PROCEDURE — 99232 SBSQ HOSP IP/OBS MODERATE 35: CPT | Performed by: INTERNAL MEDICINE

## 2022-08-07 PROCEDURE — 82746 ASSAY OF FOLIC ACID SERUM: CPT | Performed by: INTERNAL MEDICINE

## 2022-08-07 PROCEDURE — 83735 ASSAY OF MAGNESIUM: CPT | Performed by: INTERNAL MEDICINE

## 2022-08-07 PROCEDURE — 80069 RENAL FUNCTION PANEL: CPT | Performed by: INTERNAL MEDICINE

## 2022-08-07 PROCEDURE — 85025 COMPLETE CBC W/AUTO DIFF WBC: CPT | Performed by: INTERNAL MEDICINE

## 2022-08-07 PROCEDURE — 82728 ASSAY OF FERRITIN: CPT | Performed by: INTERNAL MEDICINE

## 2022-08-07 PROCEDURE — 84466 ASSAY OF TRANSFERRIN: CPT | Performed by: INTERNAL MEDICINE

## 2022-08-07 RX ORDER — FLUCONAZOLE 100 MG/1
100 TABLET ORAL EVERY 24 HOURS
Status: COMPLETED | OUTPATIENT
Start: 2022-08-07 | End: 2022-08-09

## 2022-08-07 RX ORDER — CARVEDILOL 3.12 MG/1
3.12 TABLET ORAL 2 TIMES DAILY
Status: DISCONTINUED | OUTPATIENT
Start: 2022-08-07 | End: 2022-08-08

## 2022-08-07 RX ORDER — ALLOPURINOL 100 MG/1
100 TABLET ORAL DAILY
Status: DISCONTINUED | OUTPATIENT
Start: 2022-08-07 | End: 2022-08-13 | Stop reason: HOSPADM

## 2022-08-07 RX ORDER — SACCHAROMYCES BOULARDII 250 MG
250 CAPSULE ORAL 2 TIMES DAILY
Status: DISCONTINUED | OUTPATIENT
Start: 2022-08-07 | End: 2022-08-13 | Stop reason: HOSPADM

## 2022-08-07 RX ORDER — DIPHENHYDRAMINE HCL 25 MG
25 CAPSULE ORAL EVERY 6 HOURS PRN
Status: DISCONTINUED | OUTPATIENT
Start: 2022-08-07 | End: 2022-08-13 | Stop reason: HOSPADM

## 2022-08-07 RX ORDER — CHOLESTYRAMINE LIGHT 4 G/5.7G
1 POWDER, FOR SUSPENSION ORAL EVERY 8 HOURS SCHEDULED
Status: DISCONTINUED | OUTPATIENT
Start: 2022-08-07 | End: 2022-08-11

## 2022-08-07 RX ADMIN — SODIUM CHLORIDE, SODIUM LACTATE, CALCIUM CHLORIDE, MAGNESIUM CHLORIDE AND DEXTROSE 1500 ML: 1.5; 538; 448; 18.3; 5.08 INJECTION, SOLUTION INTRAPERITONEAL at 12:30

## 2022-08-07 RX ADMIN — VANCOMYCIN HYDROCHLORIDE 125 MG: 125 CAPSULE ORAL at 12:27

## 2022-08-07 RX ADMIN — Medication 10 ML: at 09:14

## 2022-08-07 RX ADMIN — CARVEDILOL 3.12 MG: 3.12 TABLET, FILM COATED ORAL at 13:45

## 2022-08-07 RX ADMIN — DIPHENHYDRAMINE HYDROCHLORIDE 25 MG: 25 CAPSULE ORAL at 11:28

## 2022-08-07 RX ADMIN — ALLOPURINOL 100 MG: 100 TABLET ORAL at 13:45

## 2022-08-07 RX ADMIN — VANCOMYCIN HYDROCHLORIDE 125 MG: 125 CAPSULE ORAL at 01:17

## 2022-08-07 RX ADMIN — Medication 250 MG: at 22:21

## 2022-08-07 RX ADMIN — CARVEDILOL 3.12 MG: 3.12 TABLET, FILM COATED ORAL at 22:21

## 2022-08-07 RX ADMIN — SODIUM BICARBONATE 100 MEQ: 84 INJECTION, SOLUTION INTRAVENOUS at 04:57

## 2022-08-07 RX ADMIN — GENTAMICIN SULFATE 1 APPLICATION: 1 OINTMENT TOPICAL at 09:14

## 2022-08-07 RX ADMIN — VANCOMYCIN HYDROCHLORIDE 125 MG: 125 CAPSULE ORAL at 18:12

## 2022-08-07 RX ADMIN — ERYTHROPOIETIN 20000 UNITS: 20000 INJECTION, SOLUTION INTRAVENOUS; SUBCUTANEOUS at 12:27

## 2022-08-07 RX ADMIN — SODIUM BICARBONATE 650 MG TABLET 1300 MG: at 18:12

## 2022-08-07 RX ADMIN — DIPHENHYDRAMINE HYDROCHLORIDE 25 MG: 25 CAPSULE ORAL at 22:21

## 2022-08-07 RX ADMIN — ACETAMINOPHEN 325MG 650 MG: 325 TABLET ORAL at 11:35

## 2022-08-07 RX ADMIN — NYSTATIN 500000 UNITS: 100000 SUSPENSION ORAL at 12:26

## 2022-08-07 RX ADMIN — FLUCONAZOLE 100 MG: 100 TABLET ORAL at 18:12

## 2022-08-07 RX ADMIN — SODIUM CHLORIDE, SODIUM LACTATE, CALCIUM CHLORIDE, MAGNESIUM CHLORIDE AND DEXTROSE 1500 ML: 1.5; 538; 448; 18.3; 5.08 INJECTION, SOLUTION INTRAPERITONEAL at 18:44

## 2022-08-07 RX ADMIN — SODIUM CHLORIDE, SODIUM LACTATE, CALCIUM CHLORIDE, MAGNESIUM CHLORIDE AND DEXTROSE 1500 ML: 1.5; 538; 448; 18.3; 5.08 INJECTION, SOLUTION INTRAPERITONEAL at 22:22

## 2022-08-07 RX ADMIN — ACETAMINOPHEN 325MG 650 MG: 325 TABLET ORAL at 15:32

## 2022-08-07 RX ADMIN — SODIUM BICARBONATE 650 MG TABLET 1300 MG: at 09:14

## 2022-08-07 RX ADMIN — FAMOTIDINE 10 MG: 10 TABLET ORAL at 22:21

## 2022-08-07 RX ADMIN — VANCOMYCIN HYDROCHLORIDE 125 MG: 125 CAPSULE ORAL at 06:29

## 2022-08-07 RX ADMIN — ACETAMINOPHEN 325MG 650 MG: 325 TABLET ORAL at 02:05

## 2022-08-07 RX ADMIN — ACETAMINOPHEN 325MG 650 MG: 325 TABLET ORAL at 22:24

## 2022-08-07 RX ADMIN — SODIUM CHLORIDE, SODIUM LACTATE, CALCIUM CHLORIDE, MAGNESIUM CHLORIDE AND DEXTROSE 1500 ML: 1.5; 538; 448; 18.3; 5.08 INJECTION, SOLUTION INTRAPERITONEAL at 14:59

## 2022-08-07 RX ADMIN — Medication 250 MG: at 13:45

## 2022-08-07 RX ADMIN — SODIUM BICARBONATE 650 MG TABLET 1300 MG: at 22:21

## 2022-08-07 RX ADMIN — CHOLESTYRAMINE 4 G: 4 POWDER, FOR SUSPENSION ORAL at 23:51

## 2022-08-07 RX ADMIN — SODIUM CHLORIDE, SODIUM LACTATE, CALCIUM CHLORIDE, MAGNESIUM CHLORIDE AND DEXTROSE 1500 ML: 1.5; 538; 448; 18.3; 5.08 INJECTION, SOLUTION INTRAPERITONEAL at 06:29

## 2022-08-07 NOTE — CONSULTS
Discharge Planning Assessment   Steve     Patient Name: Joelle Robison  MRN: [id number]  Today's Date: 8/7/2022    Admit Date: 8/6/2022     Discharge Needs Assessment     Row Name 08/07/22 0905       Living Environment    People in Home child(chantel), adult    Name(s) of People in Home Pt lives in [facility] with her daughter.    Current Living Arrangements home    Primary Care Provided by self    Provides Primary Care For no one    Family Caregiver if Needed child(chantel), adult    Quality of Family Relationships supportive    Able to Return to Prior Arrangements yes       Resource/Environmental Concerns    Resource/Environmental Concerns none       Transition Planning    Patient/Family Anticipates Transition to home    Patient/Family Anticipated Services at Transition none    Transportation Anticipated family or friend will provide;car, drives self       Discharge Needs Assessment    Readmission Within the Last 30 Days no previous admission in last 30 days    Current Outpatient/Agency/Support Group outpatient peritoneal dialysis    Equipment Currently Used at Home --  PD Cycler    Concerns to be Addressed denies needs/concerns at this time;no discharge needs identified    Anticipated Changes Related to Illness none    Equipment Needed After Discharge none               Discharge Plan     Row Name 08/07/22 0906       Plan    Plan Pt transferred from [facility] due to vomiting/diarrhea. SW spoke with pt to complete assessment. Pt lives at home with an adult daughter- reports to be independent in ADLs. Pt is on PD at home and [facility] with [facility]. Pt denies any discharge needs at this time and plans to return home once medically stable. Pharmacy and PCP confirmed.    Patient/Family in Agreement with Plan yes              Continued Care and Services - Admitted Since 8/6/2022    Coordination has not been started for this encounter.          Demographic Summary     Row Name 08/07/22 0904       General Information     Admission Type observation    Arrived From emergency department;hospital    Referral Source admission list    Reason for Consult discharge planning    Preferred Language English       Contact Information    Permission Granted to Share Info With family/designee               Functional Status     Row Name 08/07/22 0904       Functional Status    Usual Activity Tolerance good       Functional Status, IADL    Medications independent    Meal Preparation independent    Housekeeping independent    Laundry independent    Shopping independent       Mental Status    General Appearance WDL WDL       Mental Status Summary    Recent Changes in Mental Status/Cognitive Functioning no changes       Employment/    Employment Status disabled               Psychosocial     Row Name 08/07/22 0904       Behavior WDL    Behavior WDL WDL       Emotion Mood WDL    Emotion/Mood/Affect WDL WDL       Speech WDL    Speech WDL WDL       Perceptual State WDL    Perceptual State WDL WDL       Thought Process WDL    Thought Process WDL WDL       Intellectual Performance WDL    Intellectual Performance WDL WDL       Coping/Stress    Sources of Support adult child(chantel)    Techniques to [place] with Loss/Stress/Change not applicable    Reaction to Health Status accepting    Understanding of Condition and Treatment adequate understanding of medical condition       Developmental Stage (Eriksson's)    Developmental Stage Stage 7 (35-65 years/Middle Adulthood) Generativity vs. Stagnation               Abuse/Neglect    No documentation.                Legal     Row Name 08/07/22 0905       Financial/Legal    Source of Income disability               Substance Abuse    No documentation.                Patient Forms    No documentation.                   BONG Stein

## 2022-08-07 NOTE — PLAN OF CARE
Goal Outcome Evaluation:              Outcome Evaluation: Pt has had 3 liquid stools so far this shift. Pt c/o headache gave PRN tylenol. PT a/ox4/ Pt flex moniotr dc today. Will continue to monitor

## 2022-08-07 NOTE — PROGRESS NOTES
[facility]     Nephrology Progress Note      Patient Name: Joelle Robison  : 1970  MRN: [id number]  Primary Care Physician:  Refugio Shen MD  Date of admission: 2022    Subjective   Subjective     Interval History:  Patient Reports feeling slightly better.  Having some headache.  Diarrhea is easing up.  C. difficile positive.    Review of Systems   All systems were reviewed and negative except for: What is mentioned above.    Objective   Objective     Vitals:   Temp:  [97.16 °F (36.2 °C)-100.4 °F (38 °C)] 100.4 °F (38 °C)  Heart Rate:  [84-99] 96  Resp:  [18-20] 18  BP: ()/(55-73) 112/69  Physical Exam:    Constitutional: Awake, alert, not in distress, uncomfortable.              Eyes: sclerae anicteric, no conjunctival injection, pale.              HENT: mucous membranes moist              Neck: Supple, no thyromegaly, no lymphadenopathy, trachea midline, No JVD              Respiratory: Decreased to auscultation bilaterally, nonlabored respirations               Cardiovascular: RRR, no murmurs, rubs, or gallops.              Gastrointestinal: Positive bowel sounds, soft, nontender, nondistended PD catheter exit site is clean and covered.              Musculoskeletal: No edema, no clubbing or cyanosis.              Psychiatric: Appropriate affect, cooperative              Neurologic: Oriented x 3, moving all extremities, Cranial Nerves grossly intact, speech clear              Skin: warm and dry, no rashes     Result Review    Result Reviewed:  I have personally reviewed the results from the time of this admission to 2022 10:40 EDT and agree with these findings:  [x]  Laboratory  []  Microbiology  [x]  Radiology  []  EKG/Telemetry   []  Cardiology/Vascular   []  Pathology  []  Old records  []  Other:  Lab Results   Component Value Date    GLUCOSE 114 (H) 2022    CALCIUM 8.6 2022     2022    K 4.3 2022    CO2 19.5 (L) 2022    CL 97 (L)  08/07/2022    BUN 76 (H) 08/07/2022    CREATININE 12.61 (H) 08/07/2022    EGFRIFAFRI  01/25/2021      Comment:      <15 Indicative of kidney failure.    EGFRIFNONA 6 (L) 01/25/2021    BCR 6.0 (L) 08/07/2022    ANIONGAP 20.5 (H) 08/07/2022     Lab Results   Component Value Date    CALCIUM 8.6 08/07/2022    PHOS 8.0 (H) 08/07/2022      Results from last 7 days   Lab Units 08/07/22  0607   MAGNESIUM mg/dL 1.9              Assessment & Plan   Assessment / Plan       Active Hospital Problems:  Active Hospital Problems    Diagnosis    • ESRD (end stage renal disease) ([facility])    • Gastroenteritis due to COVID-19 virus        Assessment and Plan:    - End-stage renal disease, on peritoneal dialysis, resumed yesterday.  5 exchanges per day with fill volume of 1750 mL 1.5% dextrose solution.  Well-tolerated.  PD fluid cell count is not compatible with peritonitis.  We will add nystatin swish and swallow for fungal peritonitis prevention while on antibiotics.  Will DC IV fluid and monitor.  Abdomen and pelvis CT scan without contrast showed possible proctitis in addition to PKD changes.   - History of polycystic kidney disease.  - Metabolic acidosis secondary to renal failure and diarrhea.  Better.  Continue oral bicarb and stop IV.  - Hypomagnesemia, re levels adequate.  Placed.   - Macrocytic anemia, .  Iron saturation 16% but ferritin is markedly elevated.  B12 and folate still pending.  We will add Epogen while she is here.  - COVID-positive.  - Abdominal pain, etiology is uncertain, seems better now.  Lipase mildly elevated.  Recheck in AM. Empirically on Zosyn and vancomycin.  On oral vancomycin.  No peritonitis.  - Cardiomyopathy may explain relative hypotension.  We will follow.      Electronically signed by Evelyne Mojica MD, 08/07/22, 10:32 AM EDT.

## 2022-08-07 NOTE — PROGRESS NOTES
[facility]   Hospitalist Progress Note  Date: 2022  Patient Name: Joelle Robison  : 1970  MRN: [id number]  Date of admission: 2022      Subjective   Subjective     Chief Complaint: Vomiting and diarrhea for past few days    Summary:   Joelle Robison is a 52 y.o. female with past medical history of end-stage renal disease due to polycystic kidney disease on home peritoneal dialysis by Dr. Leung, anemia of chronic kidney disease, hypertension and chronic systolic heart failure.  Patient recently came back from [place] about a week ago and contracted COVID since then.  Patient initially started with fever and chills.  Later on developed diarrhea and vomiting.  She is having frequent watery stools without any blood in it multiple times a day.  Also multiple episodes of nonbloody vomiting not able to keep anything down with decreased appetite.  Does have some nausea.  Her last vomiting was yesterday.  Last diarrhea was today..  She has mild right lower quadrant abdominal pain.  Patient has not able to peritoneal dialysis for past 2 days.  Patient has not been able to do her peritoneal dialysis for past 2 days because of this reason  Patient denies any chest pain, shortness of breath, cough, loss of taste or smell.  Patient presented to outside facility and was transferred to our hospital because of her peritoneal dialysis care.  She denies any urinary symptoms.  Denies any dizziness.  Patient denies any possible bad food exposure or recent use of antibiotics.  No other family member sick with vomiting or diarrhea.  Patient has not noticed any cloudiness in her peritoneal fluid.  Stool C. difficile came back positive.  First episode for the patient    Interval Followup:   Still spiking temperature 100.4.  Other vital signs stable with 97% saturation on room air.  Does not want to take nystatin.  Wants some pills instead of nystatin  Denies any chest pain, shortness of breath or cough.  No nausea  REFRACTIVE ERROR, OU - DISCUSSED OPTION OF CORRECTING AT THE TIME OF CATARACT SURGERY. PT UNDERSTANDS AND ELECTS TO CONSIDER THEIR OPTIONS. FOLLOW UP WITH DR. YOUNG FOR TESTING AND SURGERY PLANNING.  *PATIENT INTERESTED IN EDOF/MF OPTION TO BE LESS DEPENDENT ON READERS AFTER SX. or vomiting.  Abdominal pain improving.  Continues to have a watery diarrhea frequently.  No blood in the stools.  Appetite okay.  Started on peritoneal dialysis  Review of Systems   All systems were reviewed and negative except for: Summary and interval follow-up    Objective   Objective     Vitals:   Temp:  [97.7 °F (36.5 °C)-100.4 °F (38 °C)] 97.7 °F (36.5 °C)  Heart Rate:  [44-99] 44  Resp:  [18-20] 19  BP: (102-144)/() 105/51  Physical Exam    Constitutional: Awake, alert, no acute distress              Eyes: Pupils equal, sclerae anicteric, no conjunctival injection              HENT: NCAT, mucous membranes moist              Neck: Supple, no thyromegaly, no lymphadenopathy, trachea midline              Respiratory: Clear to auscultation bilaterally, nonlabored respirations               Cardiovascular: RRR, no murmurs, rubs, or gallops, palpable pedal pulses bilaterally              Gastrointestinal: Positive bowel sounds, soft, nontender, distended.  Minimally tender right lower quadrant no rebound guarding .  Peritoneal dialysis catheter in place in left mid abdomen no drainage, redness or pus at insertion site              Musculoskeletal: No bilateral ankle edema, no clubbing or cyanosis to extremities              Psychiatric: Appropriate affect, cooperative              Neurologic: Oriented x 3, strength symmetric in all extremities, Cranial Nerves grossly intact to confrontation, speech clear              Skin: No rashes          Result Review    Result Review:  I have personally reviewed the results from the time of this admission to 8/7/2022 17:07 EDT and agree with these findings:  [x]  Laboratory  [x]  Microbiology  [x]  Radiology  [x]  EKG/Telemetry   []  Cardiology/Vascular   [x]  Pathology  [x]  Old records  [x]  Other: Medications     Assessment & Plan   Assessment / Plan     Assessment:  COVID pneumonia  without hypoxemia  Gastroenteritis.  Likely from COVID.  C. difficile proctitis.   First episode  End-stage renal disease on peritoneal dialysis.  Missed dialysis for 2 days.  Polycystic kidney disease causing above.  Anemia of chronic kidney disease.  On Epogen  Systolic CHF.  Stable.  Hypertension.  Anion gap acidosis.  Improved  Hypomagnesemia.  Supplemented  Thrombocytopenia.  MRSA PCR positive.     Plan:   P.o. Vanco for 10 days for C. difficile proctitis  No treatment needed for COVID infection.  Patient has symptoms for 7 days and is not hypoxemic.  Off IV bicarb.  Switch to p.o. as per nephrology  Cholestyramine for diarrhea as scheduled  DC IV Vanco and Zosyn.  As needed Imodium.  Discussed with nephrology.  Appreciate input.  Resumed peritoneal dialysis  Check peritoneal fluid culture, cell count and gram stain.  Resumed appropriate home medication once list is updated.   CT of the chest and abdomen pelvis noted.  Discussed with patient  Replace electrolytes.  Trend procalcitonin.  Diflucan instead of nystatin as per patient request to prevent a fungal peritonitis.  DC telemetry    Discussed plan with RN.    DVT prophylaxis:  Mechanical DVT prophylaxis orders are present.    CODE STATUS:   Code Status (Patient has no pulse and is not breathing): CPR (Attempt to Resuscitate)  Medical Interventions (Patient has pulse or is breathing): Full Support      Part of this note may be an electronic transcription/translation of spoken language to printed text using the Dragon Dictation System.     Electronically signed by Abbe Li MD, 08/07/22, 5:07 PM EDT.

## 2022-08-08 LAB
ABO GROUP BLD: NORMAL
ABO GROUP BLD: NORMAL
ALBUMIN SERPL-MCNC: 3.7 G/DL (ref 3.5–5.2)
ANION GAP SERPL CALCULATED.3IONS-SCNC: 19.4 MMOL/L (ref 5–15)
BLD GP AB SCN SERPL QL: NEGATIVE
BUN SERPL-MCNC: 62 MG/DL (ref 6–20)
BUN/CREAT SERPL: 5.4 (ref 7–25)
CALCIUM SPEC-SCNC: 8.6 MG/DL (ref 8.6–10.5)
CHLORIDE SERPL-SCNC: 98 MMOL/L (ref 98–107)
CO2 SERPL-SCNC: 21.6 MMOL/L (ref 22–29)
CREAT SERPL-MCNC: 11.42 MG/DL (ref 0.57–1)
DEPRECATED RDW RBC AUTO: 46.1 FL (ref 37–54)
EGFRCR SERPLBLD CKD-EPI 2021: 3.6 ML/MIN/1.73
ERYTHROCYTE [DISTWIDTH] IN BLOOD BY AUTOMATED COUNT: 12.7 % (ref 12.3–15.4)
GLUCOSE SERPL-MCNC: 118 MG/DL (ref 65–99)
HCT VFR BLD AUTO: 21.4 % (ref 34–46.6)
HGB BLD-MCNC: 7.4 G/DL (ref 12–15.9)
LIPASE SERPL-CCNC: 240 U/L (ref 13–60)
MAGNESIUM SERPL-MCNC: 1.6 MG/DL (ref 1.6–2.6)
MCH RBC QN AUTO: 34.1 PG (ref 26.6–33)
MCHC RBC AUTO-ENTMCNC: 34.6 G/DL (ref 31.5–35.7)
MCV RBC AUTO: 98.6 FL (ref 79–97)
PHOSPHATE SERPL-MCNC: 7.4 MG/DL (ref 2.5–4.5)
PLATELET # BLD AUTO: 108 10*3/MM3 (ref 140–450)
PMV BLD AUTO: 10.3 FL (ref 6–12)
POTASSIUM SERPL-SCNC: 3.5 MMOL/L (ref 3.5–5.2)
PROCALCITONIN SERPL-MCNC: 16.77 NG/ML (ref 0–0.25)
RBC # BLD AUTO: 2.17 10*6/MM3 (ref 3.77–5.28)
RH BLD: POSITIVE
RH BLD: POSITIVE
SODIUM SERPL-SCNC: 139 MMOL/L (ref 136–145)
T&S EXPIRATION DATE: NORMAL
WBC NRBC COR # BLD: 4.03 10*3/MM3 (ref 3.4–10.8)

## 2022-08-08 PROCEDURE — 86900 BLOOD TYPING SEROLOGIC ABO: CPT

## 2022-08-08 PROCEDURE — 83735 ASSAY OF MAGNESIUM: CPT | Performed by: INTERNAL MEDICINE

## 2022-08-08 PROCEDURE — 85027 COMPLETE CBC AUTOMATED: CPT | Performed by: INTERNAL MEDICINE

## 2022-08-08 PROCEDURE — 25010000002 NA FERRIC GLUC CPLX PER 12.5 MG: Performed by: INTERNAL MEDICINE

## 2022-08-08 PROCEDURE — 80069 RENAL FUNCTION PANEL: CPT | Performed by: INTERNAL MEDICINE

## 2022-08-08 PROCEDURE — 83690 ASSAY OF LIPASE: CPT | Performed by: INTERNAL MEDICINE

## 2022-08-08 PROCEDURE — 84145 PROCALCITONIN (PCT): CPT | Performed by: INTERNAL MEDICINE

## 2022-08-08 PROCEDURE — 86850 RBC ANTIBODY SCREEN: CPT | Performed by: INTERNAL MEDICINE

## 2022-08-08 PROCEDURE — 86901 BLOOD TYPING SEROLOGIC RH(D): CPT | Performed by: INTERNAL MEDICINE

## 2022-08-08 PROCEDURE — 86901 BLOOD TYPING SEROLOGIC RH(D): CPT

## 2022-08-08 PROCEDURE — 63710000001 DIPHENHYDRAMINE PER 50 MG: Performed by: INTERNAL MEDICINE

## 2022-08-08 PROCEDURE — 86900 BLOOD TYPING SEROLOGIC ABO: CPT | Performed by: INTERNAL MEDICINE

## 2022-08-08 PROCEDURE — 99233 SBSQ HOSP IP/OBS HIGH 50: CPT | Performed by: INTERNAL MEDICINE

## 2022-08-08 RX ORDER — VANCOMYCIN HYDROCHLORIDE 125 MG/1
125 CAPSULE ORAL 4 TIMES DAILY
Qty: 32 CAPSULE | Refills: 0 | Status: SHIPPED | OUTPATIENT
Start: 2022-08-08 | End: 2022-08-16

## 2022-08-08 RX ORDER — CALCIUM ACETATE 667 MG/1
667 CAPSULE ORAL
Status: DISCONTINUED | OUTPATIENT
Start: 2022-08-08 | End: 2022-08-13 | Stop reason: HOSPADM

## 2022-08-08 RX ADMIN — SODIUM CHLORIDE, SODIUM LACTATE, CALCIUM CHLORIDE, MAGNESIUM CHLORIDE AND DEXTROSE 1750 ML: 1.5; 538; 448; 18.3; 5.08 INJECTION, SOLUTION INTRAPERITONEAL at 17:58

## 2022-08-08 RX ADMIN — SODIUM BICARBONATE 650 MG TABLET 1300 MG: at 15:43

## 2022-08-08 RX ADMIN — Medication 250 MG: at 22:06

## 2022-08-08 RX ADMIN — VANCOMYCIN HYDROCHLORIDE 125 MG: 125 CAPSULE ORAL at 23:37

## 2022-08-08 RX ADMIN — VANCOMYCIN HYDROCHLORIDE 125 MG: 125 CAPSULE ORAL at 18:00

## 2022-08-08 RX ADMIN — Medication 250 MG: at 11:26

## 2022-08-08 RX ADMIN — DIPHENHYDRAMINE HYDROCHLORIDE 25 MG: 25 CAPSULE ORAL at 05:51

## 2022-08-08 RX ADMIN — ALLOPURINOL 100 MG: 100 TABLET ORAL at 11:26

## 2022-08-08 RX ADMIN — FLUCONAZOLE 100 MG: 100 TABLET ORAL at 18:00

## 2022-08-08 RX ADMIN — FAMOTIDINE 10 MG: 10 TABLET ORAL at 22:07

## 2022-08-08 RX ADMIN — VANCOMYCIN HYDROCHLORIDE 125 MG: 125 CAPSULE ORAL at 01:13

## 2022-08-08 RX ADMIN — SODIUM BICARBONATE 650 MG TABLET 1300 MG: at 11:26

## 2022-08-08 RX ADMIN — DIPHENHYDRAMINE HYDROCHLORIDE 25 MG: 25 CAPSULE ORAL at 13:34

## 2022-08-08 RX ADMIN — VANCOMYCIN HYDROCHLORIDE 125 MG: 125 CAPSULE ORAL at 05:51

## 2022-08-08 RX ADMIN — SODIUM CHLORIDE, SODIUM LACTATE, CALCIUM CHLORIDE, MAGNESIUM CHLORIDE AND DEXTROSE 1750 ML: 1.5; 538; 448; 18.3; 5.08 INJECTION, SOLUTION INTRAPERITONEAL at 11:33

## 2022-08-08 RX ADMIN — Medication 10 ML: at 22:06

## 2022-08-08 RX ADMIN — ACETAMINOPHEN 325MG 650 MG: 325 TABLET ORAL at 05:52

## 2022-08-08 RX ADMIN — ACETAMINOPHEN 325MG 650 MG: 325 TABLET ORAL at 11:26

## 2022-08-08 RX ADMIN — ACETAMINOPHEN 325MG 650 MG: 325 TABLET ORAL at 22:06

## 2022-08-08 RX ADMIN — GENTAMICIN SULFATE 1 APPLICATION: 1 OINTMENT TOPICAL at 11:50

## 2022-08-08 RX ADMIN — Medication 10 ML: at 01:07

## 2022-08-08 RX ADMIN — SODIUM CHLORIDE, SODIUM LACTATE, CALCIUM CHLORIDE, MAGNESIUM CHLORIDE AND DEXTROSE 1500 ML: 1.5; 538; 448; 18.3; 5.08 INJECTION, SOLUTION INTRAPERITONEAL at 06:00

## 2022-08-08 RX ADMIN — SODIUM CHLORIDE 125 MG: 9 INJECTION, SOLUTION INTRAVENOUS at 11:29

## 2022-08-08 RX ADMIN — Medication 10 ML: at 11:28

## 2022-08-08 RX ADMIN — SODIUM CHLORIDE, SODIUM LACTATE, CALCIUM CHLORIDE, MAGNESIUM CHLORIDE AND DEXTROSE 1500 ML: 1.5; 538; 448; 18.3; 5.08 INJECTION, SOLUTION INTRAPERITONEAL at 22:30

## 2022-08-08 RX ADMIN — SODIUM CHLORIDE, SODIUM LACTATE, CALCIUM CHLORIDE, MAGNESIUM CHLORIDE AND DEXTROSE 1750 ML: 1.5; 538; 448; 18.3; 5.08 INJECTION, SOLUTION INTRAPERITONEAL at 14:10

## 2022-08-08 RX ADMIN — VANCOMYCIN HYDROCHLORIDE 125 MG: 125 CAPSULE ORAL at 11:26

## 2022-08-08 RX ADMIN — ACETAMINOPHEN 325MG 650 MG: 325 TABLET ORAL at 15:43

## 2022-08-08 RX ADMIN — SODIUM BICARBONATE 650 MG TABLET 1300 MG: at 22:07

## 2022-08-08 NOTE — PROGRESS NOTES
[facility]   Hospitalist Progress Note  Date: 2022  Patient Name: Joelle Robison  : 1970  MRN: [id number]  Date of admission: 2022      Subjective   Subjective     Chief Complaint: Vomiting and diarrhea for past few days    Summary:   Joelle Robison is a 52 y.o. female with past medical history of end-stage renal disease due to polycystic kidney disease on home peritoneal dialysis by Dr. Leung, anemia of chronic kidney disease, hypertension and chronic systolic heart failure.  Patient recently came back from [place] about a week ago and contracted COVID since then.  Patient initially started with fever and chills.  Later on developed diarrhea and vomiting.  She is having frequent watery stools without any blood in it multiple times a day.  Also multiple episodes of nonbloody vomiting not able to keep anything down with decreased appetite.  Does have some nausea.  Her last vomiting was yesterday.  Last diarrhea was today..  She has mild right lower quadrant abdominal pain.  Patient has not able to peritoneal dialysis for past 2 days.  Patient has not been able to do her peritoneal dialysis for past 2 days because of this reason  Patient denies any chest pain, shortness of breath, cough, loss of taste or smell.  Patient presented to outside facility and was transferred to our hospital because of her peritoneal dialysis care.  She denies any urinary symptoms.  Denies any dizziness.  Patient denies any possible bad food exposure or recent use of antibiotics.  No other family member sick with vomiting or diarrhea.  Patient has not noticed any cloudiness in her peritoneal fluid.  Stool C. difficile came back positive.  First episode for the patient    Interval Followup:   Still spiking temperature 100.7.  Occasional bradycardia down to 40 with 97% saturation on room air.  Nystatin swish and spit changed to p.o. Diflucan per patient request  Denies any chest pain, shortness of breath or cough.  No  nausea or vomiting.  Abdominal pain improving.  Continues to have a watery diarrhea.  Although frequency is much better but still unable to control it.    Appetite okay.  Tolerating diet well  Started on peritoneal dialysis  No urine output    Review of Systems   All systems were reviewed and negative except for: Summary and interval follow-up    Objective   Objective     Vitals:   Temp:  [97.7 °F (36.5 °C)-100.7 °F (38.2 °C)] 98.4 °F (36.9 °C)  Heart Rate:  [] 88  Resp:  [18-19] 18  BP: (105-142)/(40-79) 105/42  Physical Exam    Constitutional: Awake, alert, no acute distress              Eyes: Pupils equal, sclerae anicteric, no conjunctival injection              HENT: NCAT, mucous membranes moist              Neck: Supple, no thyromegaly, no lymphadenopathy, trachea midline              Respiratory: Clear to auscultation bilaterally, nonlabored respirations               Cardiovascular: RRR, no murmurs, rubs, or gallops, palpable pedal pulses bilaterally              Gastrointestinal: Positive bowel sounds, soft, nontender, distended.  Minimally tender right lower quadrant no rebound guarding .  Peritoneal dialysis catheter in place in left mid abdomen no drainage, redness or pus at insertion site              Musculoskeletal: No bilateral ankle edema, no clubbing or cyanosis to extremities              Psychiatric: Appropriate affect, cooperative              Neurologic: Oriented x 3, strength symmetric in all extremities, Cranial Nerves grossly intact to confrontation, speech clear              Skin: No rashes          Result Review    Result Review:  I have personally reviewed the results from the time of this admission to 8/8/2022 14:56 EDT and agree with these findings:  [x]  Laboratory  [x]  Microbiology  [x]  Radiology  [x]  EKG/Telemetry   []  Cardiology/Vascular   [x]  Pathology  [x]  Old records  [x]  Other: Medications     Assessment & Plan   Assessment / Plan     Assessment:  COVID pneumonia   without hypoxemia  Gastroenteritis.  Likely from COVID.  Improving  C. difficile proctitis.  First episode  End-stage renal disease on peritoneal dialysis.  Missed dialysis for 2 days.  Polycystic kidney disease causing above.  Anemia of chronic kidney disease.  On Epogen.  Refused blood transfusion  Iron deficiency anemia.  Supplemented.  On Epogen  Systolic CHF.  Stable.  Hypertension.  Anion gap acidosis.  Improved  Hypomagnesemia.  Supplemented  Thrombocytopenia.  Improving  MRSA PCR positive.     Plan:   Patient refused blood transfusion  P.o. Vanco for 10 days for C. difficile proctitis  No treatment needed for COVID infection.  Patient has symptoms for 7 days and is not hypoxemic.  Off IV bicarb.  Switch to p.o. as per nephrology  Cholestyramine for diarrhea as scheduled  As needed Imodium.  Stool culture negative  B12 and folic acid within normal range.  Discussed with nephrology.  Appreciate input.  Resumed peritoneal dialysis  peritoneal fluid culture negative date, cell count and gram stain noted.  Resumed appropriate home medication once list is updated.  We will hold home Coreg due to bradycardia   CT of the chest and abdomen pelvis noted.  Discussed with patient  Replace electrolytes.  Trend procalcitonin.  Improving  Diflucan instead of nystatin as per patient request to prevent a fungal peritonitis.      Discussed plan with RN and .  Working on p.o. Vanco for home.  Possible discharge in morning.    DVT prophylaxis:  Mechanical DVT prophylaxis orders are present.    CODE STATUS:   Code Status (Patient has no pulse and is not breathing): CPR (Attempt to Resuscitate)  Medical Interventions (Patient has pulse or is breathing): Full Support      Part of this note may be an electronic transcription/translation of spoken language to printed text using the Dragon Dictation System.       Electronically signed by Abbe Li MD, 08/08/22, 3:00 PM EDT.

## 2022-08-08 NOTE — PROGRESS NOTES
[facility]     Nephrology Progress Note      Patient Name: Joelle Robison  : 1970  MRN: [id number]  Primary Care Physician:  Refugio Shen MD  Date of admission: 2022    Subjective   Subjective     Interval History:  Patient Reports feeling slightly better.  Headaches are better.  Diarrhea is easing up.  C. difficile positive.  No new issues with peritoneal dialysis.    Review of Systems   All systems were reviewed and negative except for: What is mentioned above.    Objective   Objective     Vitals:   Temp:  [97.7 °F (36.5 °C)-100.7 °F (38.2 °C)] 98.4 °F (36.9 °C)  Heart Rate:  [] 88  Resp:  [18-19] 18  BP: (105-142)/(40-79) 105/42  Physical Exam:    Constitutional: Awake, alert, not in distress, uncomfortable.              Eyes: sclerae anicteric, no conjunctival injection, pale.              HENT: mucous membranes moist              Neck: Supple, no thyromegaly, no lymphadenopathy, trachea midline, No JVD              Respiratory: Decreased to auscultation bilaterally, nonlabored respirations               Cardiovascular: RRR, no murmurs, rubs, or gallops.              Gastrointestinal: Positive bowel sounds, soft, nontender, nondistended PD catheter exit site is clean and covered.              Musculoskeletal: No edema, no clubbing or cyanosis.              Psychiatric: Appropriate affect, cooperative              Neurologic: Oriented x 3, moving all extremities, Cranial Nerves grossly intact, speech clear              Skin: warm and dry, no rashes     Result Review    Result Reviewed:  I have personally reviewed the results from the time of this admission to 2022 15:32 EDT and agree with these findings:  [x]  Laboratory  []  Microbiology  [x]  Radiology  []  EKG/Telemetry   []  Cardiology/Vascular   []  Pathology  []  Old records  []  Other:  Lab Results   Component Value Date    GLUCOSE 118 (H) 2022    CALCIUM 8.6 2022     2022    K 3.5 2022     CO2 21.6 (L) 08/08/2022    CL 98 08/08/2022    BUN 62 (H) 08/08/2022    CREATININE 11.42 (H) 08/08/2022    EGFRIFAFRI  01/25/2021      Comment:      <15 Indicative of kidney failure.    EGFRIFNONA 6 (L) 01/25/2021    BCR 5.4 (L) 08/08/2022    ANIONGAP 19.4 (H) 08/08/2022     Lab Results   Component Value Date    CALCIUM 8.6 08/08/2022    PHOS 7.4 (H) 08/08/2022      Results from last 7 days   Lab Units 08/08/22  0822   MAGNESIUM mg/dL 1.6              Assessment & Plan   Assessment / Plan       Active Hospital Problems:  Active Hospital Problems    Diagnosis    • Clostridium difficile diarrhea    • ESRD (end stage renal disease) ([facility])    • Gastroenteritis due to COVID-19 virus        Assessment and Plan:    - End-stage renal disease, on peritoneal dialysis, resumed yesterday.  5 exchanges per day with fill volume of 1750 mL 1.5% dextrose solution.  Well-tolerated.  PD fluid cell count is not compatible with peritonitis.  We will add nystatin swish and swallow for fungal peritonitis prevention while on antibiotics.  Abdomen and pelvis CT scan without contrast showed possible proctitis in addition to PKD changes.   - History of polycystic kidney disease.  - Metabolic acidosis secondary to renal failure and diarrhea.  Better.  Continue oral bicarb.  - Hypomagnesemia, replaced.  Recheck in AM.  - Macrocytic anemia, .  Iron saturation 16% but ferritin is markedly elevated.  B12 and folates are ok. We will add Epogen while she is here.  - COVID-positive.  - Abdominal pain, etiology is uncertain, seems better now.  Lipase mildly elevated.  Recheck was even higher. Empirically on Zosyn and vancomycin.  On oral vancomycin.  No peritonitis.  Per primary.  - Cardiomyopathy may explain relative hypotension.  - Hyperphosphatemia secondary to renal failure.  We will add PhosLo 667 mg p.o. 3 times daily with meals.  We will follow.      Electronically signed by Evelyne Mojica MD, 08/07/22, 10:32 AM EDT.

## 2022-08-08 NOTE — PLAN OF CARE
Goal Outcome Evaluation:      Patient had successful PD x3 this shift. Medicated pain with tylenol x2. VSS. No other complaints or changes this shift.

## 2022-08-08 NOTE — PLAN OF CARE
Goal Outcome Evaluation:  Plan of Care Reviewed With: patient           Outcome Evaluation: pt continues to have c/o allergy headache. tolerating pd without difficulty

## 2022-08-09 LAB
ALBUMIN SERPL-MCNC: 3.4 G/DL (ref 3.5–5.2)
ANION GAP SERPL CALCULATED.3IONS-SCNC: 20 MMOL/L (ref 5–15)
BACTERIA FLD CULT: NORMAL
BASOPHILS # BLD AUTO: 0 10*3/MM3 (ref 0–0.2)
BASOPHILS NFR BLD AUTO: 0 % (ref 0–1.5)
BUN SERPL-MCNC: 59 MG/DL (ref 6–20)
BUN/CREAT SERPL: 5.1 (ref 7–25)
CALCIUM SPEC-SCNC: 7.9 MG/DL (ref 8.6–10.5)
CHLORIDE SERPL-SCNC: 97 MMOL/L (ref 98–107)
CO2 SERPL-SCNC: 22 MMOL/L (ref 22–29)
CREAT SERPL-MCNC: 11.5 MG/DL (ref 0.57–1)
DEPRECATED RDW RBC AUTO: 47.3 FL (ref 37–54)
EGFRCR SERPLBLD CKD-EPI 2021: 3.6 ML/MIN/1.73
EOSINOPHIL # BLD AUTO: 0.02 10*3/MM3 (ref 0–0.4)
EOSINOPHIL NFR BLD AUTO: 0.6 % (ref 0.3–6.2)
ERYTHROCYTE [DISTWIDTH] IN BLOOD BY AUTOMATED COUNT: 12.7 % (ref 12.3–15.4)
GLUCOSE SERPL-MCNC: 107 MG/DL (ref 65–99)
GRAM STN SPEC: NORMAL
HCT VFR BLD AUTO: 21.5 % (ref 34–46.6)
HGB BLD-MCNC: 7.1 G/DL (ref 12–15.9)
IMM GRANULOCYTES # BLD AUTO: 0.03 10*3/MM3 (ref 0–0.05)
IMM GRANULOCYTES NFR BLD AUTO: 0.9 % (ref 0–0.5)
LYMPHOCYTES # BLD AUTO: 0.46 10*3/MM3 (ref 0.7–3.1)
LYMPHOCYTES NFR BLD AUTO: 13.2 % (ref 19.6–45.3)
MAGNESIUM SERPL-MCNC: 1.5 MG/DL (ref 1.6–2.6)
MCH RBC QN AUTO: 33.6 PG (ref 26.6–33)
MCHC RBC AUTO-ENTMCNC: 33 G/DL (ref 31.5–35.7)
MCV RBC AUTO: 101.9 FL (ref 79–97)
MONOCYTES # BLD AUTO: 0.14 10*3/MM3 (ref 0.1–0.9)
MONOCYTES NFR BLD AUTO: 4 % (ref 5–12)
NEUTROPHILS NFR BLD AUTO: 2.84 10*3/MM3 (ref 1.7–7)
NEUTROPHILS NFR BLD AUTO: 81.3 % (ref 42.7–76)
NRBC BLD AUTO-RTO: 0 /100 WBC (ref 0–0.2)
PHOSPHATE SERPL-MCNC: 6.9 MG/DL (ref 2.5–4.5)
PLATELET # BLD AUTO: 117 10*3/MM3 (ref 140–450)
PMV BLD AUTO: 10.7 FL (ref 6–12)
POTASSIUM SERPL-SCNC: 3.3 MMOL/L (ref 3.5–5.2)
RBC # BLD AUTO: 2.11 10*6/MM3 (ref 3.77–5.28)
SODIUM SERPL-SCNC: 139 MMOL/L (ref 136–145)
WBC NRBC COR # BLD: 3.49 10*3/MM3 (ref 3.4–10.8)

## 2022-08-09 PROCEDURE — 83735 ASSAY OF MAGNESIUM: CPT | Performed by: INTERNAL MEDICINE

## 2022-08-09 PROCEDURE — 63710000001 DIPHENHYDRAMINE PER 50 MG: Performed by: INTERNAL MEDICINE

## 2022-08-09 PROCEDURE — 80069 RENAL FUNCTION PANEL: CPT | Performed by: INTERNAL MEDICINE

## 2022-08-09 PROCEDURE — 87040 BLOOD CULTURE FOR BACTERIA: CPT | Performed by: INTERNAL MEDICINE

## 2022-08-09 PROCEDURE — 85025 COMPLETE CBC W/AUTO DIFF WBC: CPT | Performed by: INTERNAL MEDICINE

## 2022-08-09 PROCEDURE — 25010000002 MAGNESIUM SULFATE IN D5W 1G/100ML (PREMIX) 1-5 GM/100ML-% SOLUTION: Performed by: INTERNAL MEDICINE

## 2022-08-09 PROCEDURE — 99233 SBSQ HOSP IP/OBS HIGH 50: CPT | Performed by: INTERNAL MEDICINE

## 2022-08-09 RX ORDER — METRONIDAZOLE 500 MG/1
500 TABLET ORAL EVERY 8 HOURS SCHEDULED
Status: DISCONTINUED | OUTPATIENT
Start: 2022-08-09 | End: 2022-08-13 | Stop reason: HOSPADM

## 2022-08-09 RX ORDER — POTASSIUM CHLORIDE 750 MG/1
20 CAPSULE, EXTENDED RELEASE ORAL ONCE
Status: COMPLETED | OUTPATIENT
Start: 2022-08-09 | End: 2022-08-09

## 2022-08-09 RX ORDER — MAGNESIUM SULFATE 1 G/100ML
1 INJECTION INTRAVENOUS ONCE
Status: COMPLETED | OUTPATIENT
Start: 2022-08-09 | End: 2022-08-09

## 2022-08-09 RX ADMIN — CALCIUM ACETATE 667 MG: 667 CAPSULE ORAL at 11:34

## 2022-08-09 RX ADMIN — Medication 250 MG: at 09:24

## 2022-08-09 RX ADMIN — SODIUM CHLORIDE, SODIUM LACTATE, CALCIUM CHLORIDE, MAGNESIUM CHLORIDE AND DEXTROSE 1750 ML: 1.5; 538; 448; 18.3; 5.08 INJECTION, SOLUTION INTRAPERITONEAL at 14:11

## 2022-08-09 RX ADMIN — SODIUM BICARBONATE 650 MG TABLET 1300 MG: at 18:01

## 2022-08-09 RX ADMIN — VANCOMYCIN HYDROCHLORIDE 125 MG: 125 CAPSULE ORAL at 18:01

## 2022-08-09 RX ADMIN — DIPHENHYDRAMINE HYDROCHLORIDE 25 MG: 25 CAPSULE ORAL at 06:09

## 2022-08-09 RX ADMIN — SODIUM CHLORIDE, SODIUM LACTATE, CALCIUM CHLORIDE, MAGNESIUM CHLORIDE AND DEXTROSE 1750 ML: 1.5; 538; 448; 18.3; 5.08 INJECTION, SOLUTION INTRAPERITONEAL at 18:02

## 2022-08-09 RX ADMIN — MAGNESIUM SULFATE HEPTAHYDRATE 1 G: 10 INJECTION, SOLUTION INTRAVENOUS at 09:22

## 2022-08-09 RX ADMIN — ACETAMINOPHEN 325MG 650 MG: 325 TABLET ORAL at 09:25

## 2022-08-09 RX ADMIN — Medication 10 ML: at 21:25

## 2022-08-09 RX ADMIN — ACETAMINOPHEN 325MG 650 MG: 325 TABLET ORAL at 04:41

## 2022-08-09 RX ADMIN — FAMOTIDINE 10 MG: 10 TABLET ORAL at 21:25

## 2022-08-09 RX ADMIN — ACETAMINOPHEN 325MG 650 MG: 325 TABLET ORAL at 14:53

## 2022-08-09 RX ADMIN — CHOLESTYRAMINE 4 G: 4 POWDER, FOR SUSPENSION ORAL at 14:11

## 2022-08-09 RX ADMIN — Medication 250 MG: at 21:25

## 2022-08-09 RX ADMIN — SODIUM CHLORIDE, SODIUM LACTATE, CALCIUM CHLORIDE, MAGNESIUM CHLORIDE AND DEXTROSE 1750 ML: 1.5; 538; 448; 18.3; 5.08 INJECTION, SOLUTION INTRAPERITONEAL at 11:32

## 2022-08-09 RX ADMIN — METRONIDAZOLE 500 MG: 500 TABLET ORAL at 18:01

## 2022-08-09 RX ADMIN — SODIUM CHLORIDE, SODIUM LACTATE, CALCIUM CHLORIDE, MAGNESIUM CHLORIDE AND DEXTROSE 1750 ML: 1.5; 538; 448; 18.3; 5.08 INJECTION, SOLUTION INTRAPERITONEAL at 21:26

## 2022-08-09 RX ADMIN — VANCOMYCIN HYDROCHLORIDE 125 MG: 125 CAPSULE ORAL at 23:43

## 2022-08-09 RX ADMIN — CALCIUM ACETATE 667 MG: 667 CAPSULE ORAL at 18:01

## 2022-08-09 RX ADMIN — POTASSIUM CHLORIDE 20 MEQ: 750 CAPSULE, EXTENDED RELEASE ORAL at 09:31

## 2022-08-09 RX ADMIN — ACETAMINOPHEN 325MG 650 MG: 325 TABLET ORAL at 21:24

## 2022-08-09 RX ADMIN — SODIUM BICARBONATE 650 MG TABLET 1300 MG: at 21:24

## 2022-08-09 RX ADMIN — CHOLESTYRAMINE 4 G: 4 POWDER, FOR SUSPENSION ORAL at 21:25

## 2022-08-09 RX ADMIN — SODIUM BICARBONATE 650 MG TABLET 1300 MG: at 09:24

## 2022-08-09 RX ADMIN — Medication 10 ML: at 09:29

## 2022-08-09 RX ADMIN — METRONIDAZOLE 500 MG: 500 TABLET ORAL at 21:25

## 2022-08-09 RX ADMIN — CALCIUM ACETATE 667 MG: 667 CAPSULE ORAL at 09:25

## 2022-08-09 RX ADMIN — ALLOPURINOL 100 MG: 100 TABLET ORAL at 09:24

## 2022-08-09 RX ADMIN — FLUCONAZOLE 100 MG: 100 TABLET ORAL at 18:01

## 2022-08-09 RX ADMIN — VANCOMYCIN HYDROCHLORIDE 125 MG: 125 CAPSULE ORAL at 06:09

## 2022-08-09 RX ADMIN — SODIUM CHLORIDE, SODIUM LACTATE, CALCIUM CHLORIDE, MAGNESIUM CHLORIDE AND DEXTROSE 1500 ML: 1.5; 538; 448; 18.3; 5.08 INJECTION, SOLUTION INTRAPERITONEAL at 06:09

## 2022-08-09 RX ADMIN — GENTAMICIN SULFATE 1 APPLICATION: 1 OINTMENT TOPICAL at 09:26

## 2022-08-09 RX ADMIN — VANCOMYCIN HYDROCHLORIDE 125 MG: 125 CAPSULE ORAL at 11:34

## 2022-08-09 NOTE — PLAN OF CARE
Goal Outcome Evaluation:  Plan of Care Reviewed With: patient        Progress: improving  Outcome Evaluation: Patient with mostly uneventful night. She did develop a fever early this AM treated effectively with Tylenol. PD completed x2 runs overnight. Order is for 1750ml in, although patient states she can only tolerate 1500ml per instillation. Amount documented in Epic, patient instructed to discuss this with MD this AM

## 2022-08-09 NOTE — PLAN OF CARE
Goal Outcome Evaluation:   Medicated x2 this shift with tylenol for pain. Discharge further delayed related to fevers. PD completed x3 this shift patient can only tolerate 1500ml instilled at a time. VSS. Will continue to monitor.

## 2022-08-09 NOTE — SIGNIFICANT NOTE
08/09/22 1135   Coping/Psychosocial   Observed Emotional State calm;cooperative   Verbalized Emotional State hopefulness   Trust Relationship/Rapport empathic listening provided   Involvement in Care interacting with patient   Additional Documentation Spiritual Care (Group)   Spiritual Care   Use of Spiritual Resources non-Quaker use of spiritual care   Spiritual Care Source  initiative   Spiritual Care Follow-Up follow-up, none required as presently assessed   Response to Spiritual Care engaged in conversation   Spiritual Care Interventions supportive conversation provided   Spiritual Care Visit Type initial   Receptivity to Spiritual Care visit welcomed

## 2022-08-09 NOTE — PROGRESS NOTES
[facility]     Nephrology Progress Note      Patient Name: Joelle Robison  : 1970  MRN: [id number]  Primary Care Physician:  Refugio Shen MD  Date of admission: 2022    Subjective   Subjective     Interval History:  Patient Reports feeling slightly better.  Headaches are better.  Diarrhea is easing up.  C. difficile positive.  Fever overnight, no new symptoms.  No new issues with peritoneal dialysis.    Review of Systems   All systems were reviewed and negative except for: What is mentioned above.    Objective   Objective     Vitals:   Temp:  [97.8 °F (36.6 °C)-103.1 °F (39.5 °C)] 100.5 °F (38.1 °C)  Heart Rate:  [] 105  Resp:  [18] 18  BP: ()/(46-66) 122/66  Physical Exam:    Constitutional: Awake, alert, not in distress, uncomfortable.              Eyes: sclerae anicteric, no conjunctival injection, pale.              HENT: mucous membranes moist              Neck: Supple, no thyromegaly, no lymphadenopathy, trachea midline, No JVD              Respiratory: Decreased to auscultation bilaterally, nonlabored respirations               Cardiovascular: RRR, no murmurs, rubs, or gallops.              Gastrointestinal: Positive bowel sounds, soft, nontender, nondistended PD catheter exit site is clean and covered.              Musculoskeletal: No edema, no clubbing or cyanosis.              Psychiatric: Appropriate affect, cooperative              Neurologic: Oriented x 3, moving all extremities, Cranial Nerves grossly intact, speech clear              Skin: warm and dry, no rashes     Result Review    Result Reviewed:  I have personally reviewed the results from the time of this admission to 2022 08:28 EDT and agree with these findings:  [x]  Laboratory  []  Microbiology  [x]  Radiology  []  EKG/Telemetry   []  Cardiology/Vascular   []  Pathology  []  Old records  []  Other:  Lab Results   Component Value Date    GLUCOSE 107 (H) 2022    CALCIUM 7.9 (L) 2022    NA  139 08/09/2022    K 3.3 (L) 08/09/2022    CO2 22.0 08/09/2022    CL 97 (L) 08/09/2022    BUN 59 (H) 08/09/2022    CREATININE 11.50 (H) 08/09/2022    EGFRIFAFRI  01/25/2021      Comment:      <15 Indicative of kidney failure.    EGFRIFNONA 6 (L) 01/25/2021    BCR 5.1 (L) 08/09/2022    ANIONGAP 20.0 (H) 08/09/2022     Lab Results   Component Value Date    CALCIUM 7.9 (L) 08/09/2022    PHOS 6.9 (H) 08/09/2022      Results from last 7 days   Lab Units 08/09/22  0603   MAGNESIUM mg/dL 1.5*              Assessment & Plan   Assessment / Plan       Active Hospital Problems:  Active Hospital Problems    Diagnosis    • Clostridium difficile diarrhea    • ESRD (end stage renal disease) ([facility])    • Gastroenteritis due to COVID-19 virus        Assessment and Plan:    - End-stage renal disease, on peritoneal dialysis, resumed yesterday.  5 exchanges per day with fill volume of 1750 mL 1.5% dextrose solution.  Well-tolerated.  PD fluid cell count is not compatible with peritonitis.  We will add nystatin swish and swallow for fungal peritonitis prevention while on antibiotics.  Abdomen and pelvis CT scan without contrast showed possible proctitis in addition to PKD changes.   - History of polycystic kidney disease.  - Metabolic acidosis secondary to renal failure and diarrhea.  Better.  Continue oral bicarb.  - Hypomagnesemia, replaced.  Recheck in AM.  - Macrocytic anemia, .  Iron saturation 16% but ferritin is markedly elevated.  B12 and folates are ok. We will add Epogen while she is here.  - COVID-positive.  - C.diff- seems better now. Empirically on Zosyn and vancomycin.  On oral vancomycin.  No peritonitis.  Per primary.  - Cardiomyopathy may explain relative hypotension.  - Hyperphosphatemia secondary to renal failure.  We will add PhosLo 667 mg p.o. 3 times daily with meals.  -hypokalemia-  Replace po.  Mag low also, replace 1gm IV today.

## 2022-08-10 LAB
ALBUMIN SERPL-MCNC: 3.7 G/DL (ref 3.5–5.2)
ANION GAP SERPL CALCULATED.3IONS-SCNC: 18.2 MMOL/L (ref 5–15)
BUN SERPL-MCNC: 54 MG/DL (ref 6–20)
BUN/CREAT SERPL: 5 (ref 7–25)
CALCIUM SPEC-SCNC: 9.1 MG/DL (ref 8.6–10.5)
CHLORIDE SERPL-SCNC: 96 MMOL/L (ref 98–107)
CO2 SERPL-SCNC: 25.8 MMOL/L (ref 22–29)
CREAT SERPL-MCNC: 10.82 MG/DL (ref 0.57–1)
EGFRCR SERPLBLD CKD-EPI 2021: 3.9 ML/MIN/1.73
GLUCOSE SERPL-MCNC: 113 MG/DL (ref 65–99)
MAGNESIUM SERPL-MCNC: 1.9 MG/DL (ref 1.6–2.6)
PHOSPHATE SERPL-MCNC: 7.6 MG/DL (ref 2.5–4.5)
POTASSIUM SERPL-SCNC: 3.3 MMOL/L (ref 3.5–5.2)
PROCALCITONIN SERPL-MCNC: 10.08 NG/ML (ref 0–0.25)
SODIUM SERPL-SCNC: 140 MMOL/L (ref 136–145)

## 2022-08-10 PROCEDURE — 84145 PROCALCITONIN (PCT): CPT | Performed by: INTERNAL MEDICINE

## 2022-08-10 PROCEDURE — 80069 RENAL FUNCTION PANEL: CPT | Performed by: INTERNAL MEDICINE

## 2022-08-10 PROCEDURE — 99233 SBSQ HOSP IP/OBS HIGH 50: CPT | Performed by: FAMILY MEDICINE

## 2022-08-10 PROCEDURE — 25010000002 MAGNESIUM SULFATE 2 GM/50ML SOLUTION: Performed by: INTERNAL MEDICINE

## 2022-08-10 PROCEDURE — 83735 ASSAY OF MAGNESIUM: CPT | Performed by: INTERNAL MEDICINE

## 2022-08-10 PROCEDURE — 25010000002 ONDANSETRON PER 1 MG: Performed by: INTERNAL MEDICINE

## 2022-08-10 PROCEDURE — 63710000001 DIPHENHYDRAMINE PER 50 MG: Performed by: INTERNAL MEDICINE

## 2022-08-10 RX ORDER — MAGNESIUM SULFATE HEPTAHYDRATE 40 MG/ML
2 INJECTION, SOLUTION INTRAVENOUS ONCE
Status: COMPLETED | OUTPATIENT
Start: 2022-08-10 | End: 2022-08-10

## 2022-08-10 RX ORDER — POTASSIUM CHLORIDE 750 MG/1
40 CAPSULE, EXTENDED RELEASE ORAL ONCE
Status: COMPLETED | OUTPATIENT
Start: 2022-08-10 | End: 2022-08-10

## 2022-08-10 RX ADMIN — SODIUM BICARBONATE 650 MG TABLET 1300 MG: at 21:15

## 2022-08-10 RX ADMIN — VANCOMYCIN HYDROCHLORIDE 125 MG: 125 CAPSULE ORAL at 13:30

## 2022-08-10 RX ADMIN — ONDANSETRON 4 MG: 2 INJECTION INTRAMUSCULAR; INTRAVENOUS at 19:02

## 2022-08-10 RX ADMIN — Medication 250 MG: at 21:15

## 2022-08-10 RX ADMIN — VANCOMYCIN HYDROCHLORIDE 125 MG: 125 CAPSULE ORAL at 23:18

## 2022-08-10 RX ADMIN — CALCIUM ACETATE 667 MG: 667 CAPSULE ORAL at 13:30

## 2022-08-10 RX ADMIN — SODIUM CHLORIDE, SODIUM LACTATE, CALCIUM CHLORIDE, MAGNESIUM CHLORIDE AND DEXTROSE 1750 ML: 1.5; 538; 448; 18.3; 5.08 INJECTION, SOLUTION INTRAPERITONEAL at 06:09

## 2022-08-10 RX ADMIN — Medication 10 ML: at 21:15

## 2022-08-10 RX ADMIN — FAMOTIDINE 10 MG: 10 TABLET ORAL at 21:15

## 2022-08-10 RX ADMIN — SODIUM CHLORIDE, SODIUM LACTATE, CALCIUM CHLORIDE, MAGNESIUM CHLORIDE AND DEXTROSE 1750 ML: 1.5; 538; 448; 18.3; 5.08 INJECTION, SOLUTION INTRAPERITONEAL at 18:29

## 2022-08-10 RX ADMIN — ACETAMINOPHEN 325MG 650 MG: 325 TABLET ORAL at 15:52

## 2022-08-10 RX ADMIN — ACETAMINOPHEN 325MG 650 MG: 325 TABLET ORAL at 06:21

## 2022-08-10 RX ADMIN — SODIUM BICARBONATE 650 MG TABLET 1300 MG: at 08:16

## 2022-08-10 RX ADMIN — VANCOMYCIN HYDROCHLORIDE 125 MG: 125 CAPSULE ORAL at 06:08

## 2022-08-10 RX ADMIN — CALCIUM ACETATE 667 MG: 667 CAPSULE ORAL at 08:17

## 2022-08-10 RX ADMIN — ACETAMINOPHEN 325MG 650 MG: 325 TABLET ORAL at 10:45

## 2022-08-10 RX ADMIN — POTASSIUM CHLORIDE 40 MEQ: 750 CAPSULE, EXTENDED RELEASE ORAL at 10:45

## 2022-08-10 RX ADMIN — VANCOMYCIN HYDROCHLORIDE 125 MG: 125 CAPSULE ORAL at 18:40

## 2022-08-10 RX ADMIN — DIPHENHYDRAMINE HYDROCHLORIDE 25 MG: 25 CAPSULE ORAL at 10:45

## 2022-08-10 RX ADMIN — SODIUM BICARBONATE 650 MG TABLET 1300 MG: at 15:00

## 2022-08-10 RX ADMIN — CHOLESTYRAMINE 4 G: 4 POWDER, FOR SUSPENSION ORAL at 15:00

## 2022-08-10 RX ADMIN — SODIUM CHLORIDE, SODIUM LACTATE, CALCIUM CHLORIDE, MAGNESIUM CHLORIDE AND DEXTROSE 1750 ML: 1.5; 538; 448; 18.3; 5.08 INJECTION, SOLUTION INTRAPERITONEAL at 22:50

## 2022-08-10 RX ADMIN — DIPHENHYDRAMINE HYDROCHLORIDE 25 MG: 25 CAPSULE ORAL at 18:28

## 2022-08-10 RX ADMIN — SODIUM CHLORIDE, SODIUM LACTATE, CALCIUM CHLORIDE, MAGNESIUM CHLORIDE AND DEXTROSE 1750 ML: 1.5; 538; 448; 18.3; 5.08 INJECTION, SOLUTION INTRAPERITONEAL at 15:00

## 2022-08-10 RX ADMIN — CALCIUM ACETATE 667 MG: 667 CAPSULE ORAL at 18:28

## 2022-08-10 RX ADMIN — Medication 10 ML: at 08:16

## 2022-08-10 RX ADMIN — ALLOPURINOL 100 MG: 100 TABLET ORAL at 08:17

## 2022-08-10 RX ADMIN — SODIUM CHLORIDE, SODIUM LACTATE, CALCIUM CHLORIDE, MAGNESIUM CHLORIDE AND DEXTROSE 1750 ML: 1.5; 538; 448; 18.3; 5.08 INJECTION, SOLUTION INTRAPERITONEAL at 12:00

## 2022-08-10 RX ADMIN — CHOLESTYRAMINE 4 G: 4 POWDER, FOR SUSPENSION ORAL at 06:08

## 2022-08-10 RX ADMIN — MAGNESIUM SULFATE HEPTAHYDRATE 2 G: 40 INJECTION, SOLUTION INTRAVENOUS at 10:44

## 2022-08-10 RX ADMIN — METRONIDAZOLE 500 MG: 500 TABLET ORAL at 15:00

## 2022-08-10 RX ADMIN — GENTAMICIN SULFATE 1 APPLICATION: 1 OINTMENT TOPICAL at 08:18

## 2022-08-10 RX ADMIN — METRONIDAZOLE 500 MG: 500 TABLET ORAL at 21:15

## 2022-08-10 RX ADMIN — Medication 250 MG: at 08:16

## 2022-08-10 RX ADMIN — METRONIDAZOLE 500 MG: 500 TABLET ORAL at 06:08

## 2022-08-10 NOTE — PROGRESS NOTES
[facility]     Nephrology Progress Note      Patient Name: Joelle Robison  : 1970  MRN: [id number]  Primary Care Physician:  Refugio Shen MD  Date of admission: 2022    Subjective   Subjective     Interval History:  Patient Reports feeling about the same.  102.9 fever this morning.  Still mild diarrhea though better.  No abdominal pain PD is going well.  Tolerating some p.o.    Review of Systems   All systems were reviewed and negative except for: What is mentioned above.    Objective   Objective     Vitals:   Temp:  [98.8 °F (37.1 °C)-102.9 °F (39.4 °C)] 98.8 °F (37.1 °C)  Heart Rate:  [] 80  Resp:  [18-22] 19  BP: ()/(51-91) 96/51  Physical Exam:    Constitutional: Awake, alert, not in distress, uncomfortable.              Eyes: sclerae anicteric, no conjunctival injection, pale.              HENT: mucous membranes moist              Neck: Supple, no thyromegaly, no lymphadenopathy, trachea midline, No JVD              Respiratory: Decreased to auscultation bilaterally, nonlabored respirations               Cardiovascular: RRR, no murmurs, rubs, or gallops.              Gastrointestinal: Positive bowel sounds, soft, nontender, nondistended PD catheter exit site is clean and covered.              Musculoskeletal: No edema, no clubbing or cyanosis.              Psychiatric: Appropriate affect, cooperative              Neurologic: Oriented x 3, moving all extremities, Cranial Nerves grossly intact, speech clear              Skin: warm and dry, no rashes     Result Review    Result Reviewed:  I have personally reviewed the results from the time of this admission to 8/10/2022 14:49 EDT and agree with these findings:  [x]  Laboratory  []  Microbiology  [x]  Radiology  []  EKG/Telemetry   []  Cardiology/Vascular   []  Pathology  []  Old records  []  Other:  Lab Results   Component Value Date    GLUCOSE 113 (H) 08/10/2022    CALCIUM 9.1 08/10/2022     08/10/2022    K 3.3 (L)  08/10/2022    CO2 25.8 08/10/2022    CL 96 (L) 08/10/2022    BUN 54 (H) 08/10/2022    CREATININE 10.82 (H) 08/10/2022    EGFRIFAFRI  01/25/2021      Comment:      <15 Indicative of kidney failure.    EGFRIFNONA 6 (L) 01/25/2021    BCR 5.0 (L) 08/10/2022    ANIONGAP 18.2 (H) 08/10/2022     Lab Results   Component Value Date    CALCIUM 9.1 08/10/2022    PHOS 7.6 (H) 08/10/2022      Results from last 7 days   Lab Units 08/10/22  0938   MAGNESIUM mg/dL 1.9              Assessment & Plan   Assessment / Plan       Active Hospital Problems:  Active Hospital Problems    Diagnosis    • Clostridium difficile diarrhea    • ESRD (end stage renal disease) ([facility])    • Gastroenteritis due to COVID-19 virus        Assessment and Plan:    - End-stage renal disease, on peritoneal dialysis, resumed yesterday.  5 exchanges per day with fill volume of 1750 mL 1.5% dextrose solution.  Well-tolerated.  PD fluid cell count is not compatible with peritonitis.  Continue nystatin swish and swallow for fungal peritonitis prevention while on antibiotics.  Abdomen and pelvis CT scan without contrast showed possible proctitis in addition to PKD changes.   - History of polycystic kidney disease.  - Metabolic acidosis secondary to renal failure and diarrhea.  Better.  Continue oral bicarb.  - Hypomagnesemia, replace and recheck in AM.  - Macrocytic anemia, .  Iron saturation 16% but ferritin is markedly elevated.  B12 and folates are ok.  Continue Epogen while she is here.  - COVID-positive with fever.  - C.diff- seems better now. Empirically on oral Flagyl and vancomycin.  Per primary  - Cardiomyopathy may explain relative hypotension.  - Hyperphosphatemia secondary to renal failure.  Continue PhosLo 667 mg p.o. 3 times daily with meals.  - Hypokalemia-  Replace po.  Recheck.

## 2022-08-10 NOTE — PLAN OF CARE
Goal Outcome Evaluation:           Progress: no change  Outcome Evaluation: STABLE. GIVEN PRN TYLENOL FOR FEVER DURING SHIFT. PD DRAINING WELL. NO COMPLAINTS OF PAIN. PT RESTING, CALL LIGHT WITHIN REACH.

## 2022-08-10 NOTE — PROGRESS NOTES
[facility]   Hospitalist Progress Note       Patient Name: Joelle Robison  : 1970  MRN: [id number]  Primary Care Physician: Refugio Shen MD  Date of admission: 2022  Today's Date: 8/10/2022  Room / Bed:   3017  Subjective   Chief Complaint:  Diarrhea.    Summary:  Joelle Robison is a 52 y.o. female with past medical history of end-stage renal disease due to polycystic kidney disease on home peritoneal dialysis by Dr. Leung, anemia of chronic kidney disease, hypertension and chronic systolic heart failure.  Patient recently came back from [place] about a week ago and contracted COVID since then.  Patient initially started with fever and chills.  Later on developed diarrhea and vomiting.  She is having frequent watery stools without any blood in it multiple times a day.  Also multiple episodes of nonbloody vomiting not able to keep anything down with decreased appetite.  Does have some nausea.  Her last vomiting was yesterday.  Last diarrhea was today..  She has mild right lower quadrant abdominal pain.  Patient has not able to peritoneal dialysis for past 2 days.  Patient has not been able to do her peritoneal dialysis for past 2 days because of this reason  Patient denies any chest pain, shortness of breath, cough, loss of taste or smell.  Patient presented to outside facility and was transferred to our hospital because of her peritoneal dialysis care.      She denies any urinary symptoms.  Denies any dizziness.  Patient denies any possible bad food exposure or recent use of antibiotics.  No other family member sick with vomiting or diarrhea.  Patient has not noticed any cloudiness in her peritoneal fluid.  Stool C. difficile came back positive.  First episode for the patient    Interval Followup: 8/10/2022    • Still with diarrhea.  C dif positive.  • MRSA + nares screen noted.  • Mild abd cramping.  Nontender.  • Peritoneal fluid cultures negative.  Minimal WBCs.  Does not appear to  be peritonitis.  • Tmax:  102.9 this a.m.  • Remains on room air  • Normotensive        REVIEW OF SYSTEMS: All other systems reviewed and are negative.   • GEN: No fevers. No chills. No weight gain. No weight loss.     • HEENT:   No dysphagia/odynophagia. No visual disturbance.    • GI:    No N/V.  No abd pain. No diarrhea. No constipation.  No bloody/black tarry stools. No hematemesis.   • CV: No chest discomfort.  No palps. No lightheadedness. No syncope. No orthopnea/PND. No edema.   • RESP:    No cough. No wheeze.  No increased sputum. No hemoptysis. No CORNEJO.  • :   No dysuria or suprapubic discomfort. No frequency.No urgency. No hesitancy. No incontinence. No hematuria. No flank pain.    • MS:   No joint stiffness or arthralgias. No myalgias. No muscle weakness.    • SKIN:   No painful or pruritic rashes.  No skin discoloration.  • NEURO:  No focal numbness or weakness.  No headaches.  No ataxia. No slurred speech. No receptive/expressive aphasia.      • PSYCH:   No anxiety. No depression.  • ENDO:  No tremor, hair loss, heat or cold intolerance.  Objective   Temp:  [98.8 °F (37.1 °C)-102.9 °F (39.4 °C)] 98.8 °F (37.1 °C)  Heart Rate:  [] 80  Resp:  [18-22] 19  BP: ()/(51-91) 96/51  PHYSICAL EXAM   • CON: WN. WD. NAD.   • EYES:  Sclera anicteric. EOMI. Normal conjunctiva.   • ENT:  Oropharyngeal mucosa without ulcers or thrush.    • NECK:  No thyromegaly. No stridor. Trachea midline.  • RESP:  CTA. No wheezes. No crackles.  No work of breathing or tachypnea.   • CV:  Rhythm regular. Rate WNL. No murmur noted.  No edema.  • GI:  Soft and nontender. Nondistended.  Bowel sounds present.   • EXT: Peripheral pulses intact.  No joint deformities or cyanosis.  • LYMPH:  No lymphedema noted.  No cervical lymphadenopathy.  • PSYCH:  Alert. Oriented. Normal affect and mood.  • NEURO:  CNII-XII grossly intact. No dysarthria or aphasia. No unilateral weakness or paresthesia.  • SKIN: No chronic venous stasis  changes or varicosities.  No cellulitis    Results from last 7 days   Lab Units 08/09/22  0603 08/08/22  0822 08/07/22  0607 08/06/22  1106   WBC 10*3/mm3 3.49 4.03 4.25 4.17   HEMOGLOBIN g/dL 7.1* 7.4* 7.5* 7.9*   HEMATOCRIT % 21.5* 21.4* 22.5* 23.7*   PLATELETS 10*3/mm3 117* 108* 90* 90*     Results from last 7 days   Lab Units 08/10/22  0938 08/09/22  0603 08/08/22  0822 08/07/22  0607 08/06/22  1106   SODIUM mmol/L 140 139 139 137 138   POTASSIUM mmol/L 3.3* 3.3* 3.5 4.3 5.0   CO2 mmol/L 25.8 22.0 21.6* 19.5* 11.8*   CHLORIDE mmol/L 96* 97* 98 97* 100   ANION GAP mmol/L 18.2* 20.0* 19.4* 20.5* 26.2*   BUN mg/dL 54* 59* 62* 76* 77*   CREATININE mg/dL 10.82* 11.50* 11.42* 12.61* 13.46*   GLUCOSE mg/dL 113* 107* 118* 114* 96           RESULTS REVIEWED:  I have personally reviewed the results from the time of this admission to 8/10/2022 14:26 EDT and agree with these findings:  []  Laboratory  []  Microbiology  []  Radiology  []  EKG/Telemetry   []  Cardiology/Vascular   []  Pathology  []  Old records  []  Other:  Assessment / Plan   Assessment:    COVID pneumonia without hypoxemia  Gastroenteritis.  Likely from COVID.  Improving  C. difficile associated diarrhea.  First episode  End-stage renal disease on peritoneal dialysis.  (Missed dialysis for 2 days prior to admission.)  Polycystic kidney disease causing above.  Anemia of chronic kidney disease.  On Epogen.  Refused blood transfusion  Iron deficiency anemia.  Supplemented.    Systolic CHF.  Stable.  Hypertension.  Anion gap acidosis.  Improved  Hypomagnesemia.  Supplemented  Thrombocytopenia.  Improving  MRSA PCR positive.     Plan:  Nephrology consulted:  Dr. Leung  Discussed with nephrology.  Appreciate input.  Resumed peritoneal dialysis;  peritoneal fluid culture negative , cell count and gram stain noted.  P.O. Vanc x 10 days for C. difficile  We will add p.o. Flagyl because of ongoing diarrhea and spiking temperature.  Repeat blood culture because of  febrile episode  Monitor H&H .... Patient refused blood transfusion  Continue p.o bicarb. as per nephrology  Cholestyramine for diarrhea as scheduled  As needed Imodium.  Stool culture negative  B12 and folic acid within normal range.  Resumed appropriate home medication.  Holding home Coreg due to bradycardia  CT of the chest and abdomen pelvis noted.  Discussed with patient  Replace electrolytes.  Trend procalcitonin ... Improving  Finished Diflucan instead of nystatin as per patient request to prevent a fungal peritonitis.  No treatment needed for COVID infection.  Patient has symptoms for 7 days and is not hypoxemic.  Discussed plan with RN.  DVT prophylaxis:  Mechanical DVT prophylaxis orders are present.  CODE STATUS:      Code Status (Patient has no pulse and is not breathing): CPR (Attempt to Resuscitate)  Medical Interventions (Patient has pulse or is breathing): Full Support       Electronically signed by DELIO San, 08/10/22, 2:26 PM EDT.    Attending documentation:  I reviewed the above documentation and independently reviewed and rounded and evaluated the patient and discussed the care plan with NYA De Leon PA-C, I agree with his findings and plan as documented, what I have added to the care plan and modified is as follows in my documentation and my medical decision making; unfortunate 52-year-old female with ESRD on peritoneal dialysis, was hospitalized on 8/6/2022 with diarrhea symptoms, fevers, chills, recent COVID-19 diagnosis, diarrhea likely associated COVID-19 but also tested positive for C. difficile, placed on vancomycin, peritoneal dialysis associated peritonitis was ruled out, she remains on antibiotics including MRSA coverage as she was swab positive.  Interval follow-up: Patient seen and examined this morning, no acute distress, no acute major night events, patient continues have diarrhea symptoms, reports cough and shortness of breath symptoms, mostly nasal congestion and sinus  drainage.  She is tolerating peritoneal dialysis.  Denies fevers, chills, sweats but last fever was earlier this morning at 6:11 AM T-max 102.9 °F.  Denies abdominal cramping.  No focal pain symptoms.  Review of systems obtained, all systems reviewed and negative except for diarrhea, trams fatigue, generalized weakness, sinus congestion, cough.  Vitals reviewed, labs reviewed, on physical exam, well-appearing female ambulating from the bedside commode to the bed, in no acute distress, EOMI, clear nasal discharge, clear auscultation bilaterally, no wheezing or rhonchi, regular rate rhythm, soft nontender abdomen, no lower extreme edema, peritoneal dialysis catheter in place with no signs of purulence in tubing.  Assessment as above, plan is to continue peritoneal dialysis as ordered by nephrology, to continue vancomycin orally, she continues to spike fever despite being on oral vancomycin, adding Flagyl for double coverage, continuing other medications including p.o. bicarb, completing courses for Diflucan, monitoring for worsening symptoms of COVID-19, still in the window for potential decompensation.  A.m. labs, full code, DVT prophylaxis with SCDs, clinical course to dictate further management, discussed with nurse at the bedside.  More than 70% of the time of this patient's encounter was performed by me, this included face-to-face time, planning and coordinating, medical decision making and critical thinking personally done by me.  -CURT LYNNE    Electronically signed by Tyler Rodríguez MD, 08/10/22, 6:32 PM EDT.    Dragon disclaimer:  Much of this encounter note is an electronic transcription/translation of spoken language to printed text. The electronic translation of spoken language may permit erroneous, or at times, nonsensical words or phrases to be inadvertently transcribed. Although I have reviewed the note for such errors, some may still exist.

## 2022-08-11 LAB
ALBUMIN SERPL-MCNC: 3.9 G/DL (ref 3.5–5.2)
ALP SERPL-CCNC: 65 U/L (ref 39–117)
ALT SERPL W P-5'-P-CCNC: 19 U/L (ref 1–33)
ANION GAP SERPL CALCULATED.3IONS-SCNC: 17.7 MMOL/L (ref 5–15)
AST SERPL-CCNC: 31 U/L (ref 1–32)
BACTERIA SPEC AEROBE CULT: NORMAL
BACTERIA SPEC AEROBE CULT: NORMAL
BASOPHILS # BLD AUTO: 0.01 10*3/MM3 (ref 0–0.2)
BASOPHILS NFR BLD AUTO: 0.2 % (ref 0–1.5)
BILIRUB CONJ SERPL-MCNC: 0.2 MG/DL (ref 0–0.3)
BILIRUB INDIRECT SERPL-MCNC: 0.2 MG/DL
BILIRUB SERPL-MCNC: 0.4 MG/DL (ref 0–1.2)
BUN SERPL-MCNC: 48 MG/DL (ref 6–20)
BUN/CREAT SERPL: 4.5 (ref 7–25)
CALCIUM SPEC-SCNC: 9.9 MG/DL (ref 8.6–10.5)
CHLORIDE SERPL-SCNC: 96 MMOL/L (ref 98–107)
CO2 SERPL-SCNC: 27.3 MMOL/L (ref 22–29)
CREAT SERPL-MCNC: 10.74 MG/DL (ref 0.57–1)
DEPRECATED RDW RBC AUTO: 45.3 FL (ref 37–54)
EGFRCR SERPLBLD CKD-EPI 2021: 3.9 ML/MIN/1.73
EOSINOPHIL # BLD AUTO: 0.05 10*3/MM3 (ref 0–0.4)
EOSINOPHIL NFR BLD AUTO: 1.2 % (ref 0.3–6.2)
ERYTHROCYTE [DISTWIDTH] IN BLOOD BY AUTOMATED COUNT: 12.6 % (ref 12.3–15.4)
GLUCOSE SERPL-MCNC: 109 MG/DL (ref 65–99)
HCT VFR BLD AUTO: 24.7 % (ref 34–46.6)
HGB BLD-MCNC: 8.3 G/DL (ref 12–15.9)
IMM GRANULOCYTES # BLD AUTO: 0.03 10*3/MM3 (ref 0–0.05)
IMM GRANULOCYTES NFR BLD AUTO: 0.7 % (ref 0–0.5)
LYMPHOCYTES # BLD AUTO: 0.71 10*3/MM3 (ref 0.7–3.1)
LYMPHOCYTES NFR BLD AUTO: 17.5 % (ref 19.6–45.3)
MAGNESIUM SERPL-MCNC: 2.4 MG/DL (ref 1.6–2.6)
MCH RBC QN AUTO: 33.6 PG (ref 26.6–33)
MCHC RBC AUTO-ENTMCNC: 33.6 G/DL (ref 31.5–35.7)
MCV RBC AUTO: 100 FL (ref 79–97)
MONOCYTES # BLD AUTO: 0.23 10*3/MM3 (ref 0.1–0.9)
MONOCYTES NFR BLD AUTO: 5.7 % (ref 5–12)
NEUTROPHILS NFR BLD AUTO: 3.02 10*3/MM3 (ref 1.7–7)
NEUTROPHILS NFR BLD AUTO: 74.7 % (ref 42.7–76)
NRBC BLD AUTO-RTO: 0 /100 WBC (ref 0–0.2)
PHOSPHATE SERPL-MCNC: 7.2 MG/DL (ref 2.5–4.5)
PLATELET # BLD AUTO: 186 10*3/MM3 (ref 140–450)
PMV BLD AUTO: 10.5 FL (ref 6–12)
POTASSIUM SERPL-SCNC: 3.5 MMOL/L (ref 3.5–5.2)
PROT SERPL-MCNC: 6.7 G/DL (ref 6–8.5)
RBC # BLD AUTO: 2.47 10*6/MM3 (ref 3.77–5.28)
SODIUM SERPL-SCNC: 141 MMOL/L (ref 136–145)
WBC NRBC COR # BLD: 4.05 10*3/MM3 (ref 3.4–10.8)

## 2022-08-11 PROCEDURE — 85025 COMPLETE CBC W/AUTO DIFF WBC: CPT | Performed by: FAMILY MEDICINE

## 2022-08-11 PROCEDURE — 25010000002 ONDANSETRON PER 1 MG: Performed by: INTERNAL MEDICINE

## 2022-08-11 PROCEDURE — 80048 BASIC METABOLIC PNL TOTAL CA: CPT | Performed by: INTERNAL MEDICINE

## 2022-08-11 PROCEDURE — 99233 SBSQ HOSP IP/OBS HIGH 50: CPT | Performed by: FAMILY MEDICINE

## 2022-08-11 PROCEDURE — 63710000001 DIPHENHYDRAMINE PER 50 MG: Performed by: INTERNAL MEDICINE

## 2022-08-11 PROCEDURE — 84100 ASSAY OF PHOSPHORUS: CPT | Performed by: INTERNAL MEDICINE

## 2022-08-11 PROCEDURE — 83735 ASSAY OF MAGNESIUM: CPT | Performed by: FAMILY MEDICINE

## 2022-08-11 PROCEDURE — 80076 HEPATIC FUNCTION PANEL: CPT | Performed by: FAMILY MEDICINE

## 2022-08-11 RX ORDER — SODIUM BICARBONATE 650 MG/1
650 TABLET ORAL 3 TIMES DAILY
Status: DISCONTINUED | OUTPATIENT
Start: 2022-08-11 | End: 2022-08-12

## 2022-08-11 RX ADMIN — DIPHENHYDRAMINE HYDROCHLORIDE 25 MG: 25 CAPSULE ORAL at 18:00

## 2022-08-11 RX ADMIN — SODIUM BICARBONATE 650 MG TABLET 650 MG: at 16:06

## 2022-08-11 RX ADMIN — Medication 10 ML: at 09:28

## 2022-08-11 RX ADMIN — GENTAMICIN SULFATE 1 APPLICATION: 1 OINTMENT TOPICAL at 09:28

## 2022-08-11 RX ADMIN — DIPHENHYDRAMINE HYDROCHLORIDE 25 MG: 25 CAPSULE ORAL at 03:14

## 2022-08-11 RX ADMIN — CALCIUM ACETATE 667 MG: 667 CAPSULE ORAL at 09:28

## 2022-08-11 RX ADMIN — METRONIDAZOLE 500 MG: 500 TABLET ORAL at 14:27

## 2022-08-11 RX ADMIN — Medication 10 ML: at 21:55

## 2022-08-11 RX ADMIN — VANCOMYCIN HYDROCHLORIDE 125 MG: 125 CAPSULE ORAL at 11:21

## 2022-08-11 RX ADMIN — SODIUM BICARBONATE 650 MG TABLET 650 MG: at 21:54

## 2022-08-11 RX ADMIN — FAMOTIDINE 10 MG: 10 TABLET ORAL at 21:55

## 2022-08-11 RX ADMIN — ACETAMINOPHEN 325MG 650 MG: 325 TABLET ORAL at 18:01

## 2022-08-11 RX ADMIN — METRONIDAZOLE 500 MG: 500 TABLET ORAL at 06:25

## 2022-08-11 RX ADMIN — SODIUM CHLORIDE, SODIUM LACTATE, CALCIUM CHLORIDE, MAGNESIUM CHLORIDE AND DEXTROSE 1750 ML: 1.5; 538; 448; 18.3; 5.08 INJECTION, SOLUTION INTRAPERITONEAL at 06:25

## 2022-08-11 RX ADMIN — VANCOMYCIN HYDROCHLORIDE 125 MG: 125 CAPSULE ORAL at 18:00

## 2022-08-11 RX ADMIN — SODIUM CHLORIDE, SODIUM LACTATE, CALCIUM CHLORIDE, MAGNESIUM CHLORIDE AND DEXTROSE 1750 ML: 1.5; 538; 448; 18.3; 5.08 INJECTION, SOLUTION INTRAPERITONEAL at 14:27

## 2022-08-11 RX ADMIN — CALCIUM ACETATE 667 MG: 667 CAPSULE ORAL at 18:00

## 2022-08-11 RX ADMIN — SODIUM CHLORIDE, SODIUM LACTATE, CALCIUM CHLORIDE, MAGNESIUM CHLORIDE AND DEXTROSE 1750 ML: 1.5; 538; 448; 18.3; 5.08 INJECTION, SOLUTION INTRAPERITONEAL at 11:31

## 2022-08-11 RX ADMIN — SODIUM BICARBONATE 650 MG TABLET 1300 MG: at 09:28

## 2022-08-11 RX ADMIN — SODIUM CHLORIDE, SODIUM LACTATE, CALCIUM CHLORIDE, MAGNESIUM CHLORIDE AND DEXTROSE 1750 ML: 1.5; 538; 448; 18.3; 5.08 INJECTION, SOLUTION INTRAPERITONEAL at 22:19

## 2022-08-11 RX ADMIN — ALLOPURINOL 100 MG: 100 TABLET ORAL at 09:28

## 2022-08-11 RX ADMIN — SODIUM CHLORIDE, SODIUM LACTATE, CALCIUM CHLORIDE, MAGNESIUM CHLORIDE AND DEXTROSE 1750 ML: 1.5; 538; 448; 18.3; 5.08 INJECTION, SOLUTION INTRAPERITONEAL at 18:02

## 2022-08-11 RX ADMIN — Medication 250 MG: at 09:28

## 2022-08-11 RX ADMIN — METRONIDAZOLE 500 MG: 500 TABLET ORAL at 21:55

## 2022-08-11 RX ADMIN — VANCOMYCIN HYDROCHLORIDE 125 MG: 125 CAPSULE ORAL at 06:25

## 2022-08-11 RX ADMIN — ONDANSETRON 4 MG: 2 INJECTION INTRAMUSCULAR; INTRAVENOUS at 11:21

## 2022-08-11 RX ADMIN — CHOLESTYRAMINE 4 G: 4 POWDER, FOR SUSPENSION ORAL at 06:25

## 2022-08-11 RX ADMIN — VANCOMYCIN HYDROCHLORIDE 125 MG: 125 CAPSULE ORAL at 23:32

## 2022-08-11 RX ADMIN — CALCIUM ACETATE 667 MG: 667 CAPSULE ORAL at 11:21

## 2022-08-11 RX ADMIN — ACETAMINOPHEN 325MG 650 MG: 325 TABLET ORAL at 03:13

## 2022-08-11 RX ADMIN — Medication 250 MG: at 21:54

## 2022-08-11 NOTE — PROGRESS NOTES
[facility]     Nephrology Progress Note      Patient Name: Joelle Robison  : 1970  MRN: [id number]  Primary Care Physician:  Refugio Shen MD  Date of admission: 2022    Subjective   Subjective     Interval History:  Patient Reports feeling about the same.  Still having fevers.  Still mild diarrhea though better.  No abdominal pain PD is going well.  Tolerating some p.o.    Review of Systems   All systems were reviewed and negative except for: What is mentioned above.    Objective   Objective     Vitals:   Temp:  [97.9 °F (36.6 °C)-101.2 °F (38.4 °C)] 97.9 °F (36.6 °C)  Heart Rate:  [68-86] 73  Resp:  [17-19] 18  BP: ()/(58-76) 107/66  Physical Exam:    Constitutional: Awake, alert, not in distress, uncomfortable.              Eyes: sclerae anicteric, no conjunctival injection, pale.              HENT: mucous membranes moist              Neck: Supple, no thyromegaly, no lymphadenopathy, trachea midline, No JVD              Respiratory: Decreased to auscultation bilaterally, nonlabored respirations               Cardiovascular: RRR, no murmurs, rubs, or gallops.              Gastrointestinal: Positive bowel sounds, soft, nontender, nondistended PD catheter exit site is clean and covered.              Musculoskeletal: No edema, no clubbing or cyanosis.              Psychiatric: Appropriate affect, cooperative              Neurologic: Oriented x 3, moving all extremities, Cranial Nerves grossly intact, speech clear              Skin: warm and dry, no rashes     Result Review    Result Reviewed:  I have personally reviewed the results from the time of this admission to 2022 10:58 EDT and agree with these findings:  [x]  Laboratory  []  Microbiology  [x]  Radiology  []  EKG/Telemetry   []  Cardiology/Vascular   []  Pathology  []  Old records  []  Other:  Lab Results   Component Value Date    GLUCOSE 109 (H) 2022    CALCIUM 9.9 2022     2022    K 3.5 2022     CO2 27.3 08/11/2022    CL 96 (L) 08/11/2022    BUN 48 (H) 08/11/2022    CREATININE 10.74 (H) 08/11/2022    EGFRIFAFRI  01/25/2021      Comment:      <15 Indicative of kidney failure.    EGFRIFNONA 6 (L) 01/25/2021    BCR 4.5 (L) 08/11/2022    ANIONGAP 17.7 (H) 08/11/2022     Lab Results   Component Value Date    CALCIUM 9.9 08/11/2022    PHOS 7.2 (H) 08/11/2022      Results from last 7 days   Lab Units 08/11/22  0947   MAGNESIUM mg/dL 2.4              Assessment & Plan   Assessment / Plan       Active Hospital Problems:  Active Hospital Problems    Diagnosis    • Clostridium difficile diarrhea    • ESRD (end stage renal disease) ([facility])    • Gastroenteritis due to COVID-19 virus        Assessment and Plan:    - End-stage renal disease, on peritoneal dialysis, resumed yesterday.  5 exchanges per day with fill volume of 1750 mL 1.5% dextrose solution.  Well-tolerated.  PD fluid cell count is not compatible with peritonitis.  Continue nystatin swish and swallow for fungal peritonitis prevention while on antibiotics.  Abdomen and pelvis CT scan without contrast showed possible proctitis in addition to PKD changes.   - History of polycystic kidney disease.  - Metabolic acidosis secondary to renal failure and diarrhea.  Better.  Continue oral bicarb.  Will reduce to 650mg tid since serum bicarb up.  - Hypomagnesemia, Mag ok.  - Macrocytic anemia, .  Iron saturation 16% but ferritin is markedly elevated.  B12 and folates are ok.  Continue Epogen while she is here.  - COVID-positive with fever.  - C.diff- seems better now. Empirically on oral Flagyl and vancomycin.  Per primary  - Cardiomyopathy may explain relative hypotension.  - Hyperphosphatemia secondary to renal failure.  Continue PhosLo 667 mg p.o. 3 times daily with meals.  - Hypokalemia-  Replaced po.  Monitor.

## 2022-08-11 NOTE — PLAN OF CARE
Goal Outcome Evaluation:              Outcome Evaluation: VSS. PD X 3 this shift. Tolerated well. Complaints of pain and nausea & vomitting, medicated per MAR. No other complaints or concerns this shift.  Problem: Adult Inpatient Plan of Care  Goal: Plan of Care Review  Outcome: Ongoing, Progressing  Flowsheets  Taken 8/10/2022 1959 by Dianna Killian, RN  Outcome Evaluation: VSS. PD X 3 this shift. Tolerated well. Complaints of pain and nausea & vomitting, medicated per MAR. No other complaints or concerns this shift.  Taken 8/10/2022 0644 by Erich Santana, RN  Progress: no change  Goal: Patient-Specific Goal (Individualized)  Outcome: Ongoing, Progressing  Goal: Absence of Hospital-Acquired Illness or Injury  Outcome: Ongoing, Progressing  Intervention: Identify and Manage Fall Risk  Recent Flowsheet Documentation  Taken 8/10/2022 1633 by Dianna Killian RN  Safety Promotion/Fall Prevention: safety round/check completed  Taken 8/10/2022 0818 by Dianna Killian RN  Safety Promotion/Fall Prevention: safety round/check completed  Goal: Optimal Comfort and Wellbeing  Outcome: Ongoing, Progressing  Intervention: Provide Person-Centered Care  Recent Flowsheet Documentation  Taken 8/10/2022 0818 by Dianna Killian RN  Trust Relationship/Rapport:   care explained   choices provided   emotional support provided   empathic listening provided   questions answered   questions encouraged   reassurance provided   thoughts/feelings acknowledged  Goal: Readiness for Transition of Care  Outcome: Ongoing, Progressing     Problem: Diarrhea  Goal: Fluid and Electrolyte Balance  Outcome: Ongoing, Progressing

## 2022-08-11 NOTE — PROGRESS NOTES
[facility]   Hospitalist Progress Note       Patient Name: Joelle Robison  : 1970  MRN: [id number]  Primary Care Physician: Refugio Shen MD  Date of admission: 2022  Today's Date: 2022  Room / Bed:   [facility]  Subjective   Chief Complaint:  Diarrhea.    Summary:  Joelle Robison is a 52 y.o. female with past medical history of end-stage renal disease due to polycystic kidney disease on home peritoneal dialysis by Dr. Leung, anemia of chronic kidney disease, hypertension and chronic systolic heart failure.  Patient recently came back from [place] about a week ago and contracted COVID since then.  Patient initially started with fever and chills.  Later on developed diarrhea and vomiting.  She is having frequent watery stools without any blood in it multiple times a day.  Also multiple episodes of nonbloody vomiting not able to keep anything down with decreased appetite.  Does have some nausea.  Her last vomiting was yesterday.  Last diarrhea was today..  She has mild right lower quadrant abdominal pain.  Patient has not able to peritoneal dialysis for past 2 days.  Patient has not been able to do her peritoneal dialysis for past 2 days because of this reason  Patient denies any chest pain, shortness of breath, cough, loss of taste or smell.  Patient presented to outside facility and was transferred to our hospital because of her peritoneal dialysis care.      She denies any urinary symptoms.  Denies any dizziness.  Patient denies any possible bad food exposure or recent use of antibiotics.  No other family member sick with vomiting or diarrhea.  Patient has not noticed any cloudiness in her peritoneal fluid.  Stool C. difficile came back positive.  First episode for the patient    Interval Followup: 2022    • C dif positive with diarrhea which is improving.  Reports no diarrhea today.  • Mild abd cramping.   • Some nausea with meds (likely from cholestyramine packets)  • Peritoneal  fluid cultures negative.  Minimal WBCs.  Does not appear to be peritonitis.  • Tmax:  101.2 this a.m.  • Remains on room air  • Normotensive        REVIEW OF SYSTEMS: All other systems reviewed and are negative.   • GEN: No fevers. No chills. No weight gain. No weight loss.     • HEENT:   No dysphagia/odynophagia. No visual disturbance.    • GI:    No N/V.  No abd pain. No diarrhea. No constipation.  No bloody/black tarry stools. No hematemesis.   • CV: No chest discomfort.  No palps. No lightheadedness. No syncope. No orthopnea/PND. No edema.   • RESP:    No cough. No wheeze.  No increased sputum. No hemoptysis. No CORNEJO.  • :   No dysuria or suprapubic discomfort. No frequency.No urgency. No hesitancy. No incontinence. No hematuria. No flank pain.    • MS:   No joint stiffness or arthralgias. No myalgias. No muscle weakness.    • SKIN:   No painful or pruritic rashes.  No skin discoloration.  • NEURO:  No focal numbness or weakness.  No headaches.  No ataxia. No slurred speech. No receptive/expressive aphasia.      • PSYCH:   No anxiety. No depression.  • ENDO:  No tremor, hair loss, heat or cold intolerance.  Objective   Temp:  [97.5 °F (36.4 °C)-101.2 °F (38.4 °C)] 97.5 °F (36.4 °C)  Heart Rate:  [68-86] 80  Resp:  [17-19] 18  BP: ()/(58-76) 107/64  PHYSICAL EXAM   • CON: WN. WD. NAD.   • EYES:  Sclera anicteric. EOMI. Normal conjunctiva.   • ENT:  Oropharyngeal mucosa without ulcers or thrush.    • NECK:  No thyromegaly. No stridor. Trachea midline.  • RESP:  CTA. No wheezes. No crackles.  No work of breathing or tachypnea.   • CV:  Rhythm regular. Rate WNL. No murmur noted.  No edema.  • GI:  Soft and nontender. Nondistended.  Bowel sounds present.   • EXT: Peripheral pulses intact.  No joint deformities or cyanosis.  • LYMPH:  No lymphedema noted.  No cervical lymphadenopathy.  • PSYCH:  Alert. Oriented. Normal affect and mood.  • NEURO:  CNII-XII grossly intact. No dysarthria or aphasia. No unilateral  weakness or paresthesia.  • SKIN: No chronic venous stasis changes or varicosities.  No cellulitis    Results from last 7 days   Lab Units 08/11/22  0947 08/09/22  0603 08/08/22  0822 08/07/22  0607 08/06/22  1106   WBC 10*3/mm3 4.05 3.49 4.03 4.25 4.17   HEMOGLOBIN g/dL 8.3* 7.1* 7.4* 7.5* 7.9*   HEMATOCRIT % 24.7* 21.5* 21.4* 22.5* 23.7*   PLATELETS 10*3/mm3 186 117* 108* 90* 90*     Results from last 7 days   Lab Units 08/11/22  0947 08/10/22  0938 08/09/22  0603 08/08/22  0822 08/07/22  0607 08/06/22  1106   SODIUM mmol/L 141 140 139 139 137 138   POTASSIUM mmol/L 3.5 3.3* 3.3* 3.5 4.3 5.0   CO2 mmol/L 27.3 25.8 22.0 21.6* 19.5* 11.8*   CHLORIDE mmol/L 96* 96* 97* 98 97* 100   ANION GAP mmol/L 17.7* 18.2* 20.0* 19.4* 20.5* 26.2*   BUN mg/dL 48* 54* 59* 62* 76* 77*   CREATININE mg/dL 10.74* 10.82* 11.50* 11.42* 12.61* 13.46*   GLUCOSE mg/dL 109* 113* 107* 118* 114* 96           RESULTS REVIEWED:  I have personally reviewed the results from the time of this admission to 8/11/2022 12:53 EDT and agree with these findings:  []  Laboratory  []  Microbiology  []  Radiology  []  EKG/Telemetry   []  Cardiology/Vascular   []  Pathology  []  Old records  []  Other:  Assessment / Plan   Assessment:    · COVID pneumonia without hypoxemia  · Gastroenteritis.  Likely from COVID.  Improving  · C. difficile associated diarrhea.  First episode  · End-stage renal disease on peritoneal dialysis.  (Missed dialysis for 2 days prior to  · Admission.)  · Polycystic kidney disease causing above.  · Anemia of chronic kidney disease.  On Epogen.  Refused blood transfusion  · Iron deficiency anemia.  Supplemented.    · Systolic CHF.  Stable.  · Hypertension.  · Anion gap acidosis.  Improved  · Hypomagnesemia.  Supplemented  · Thrombocytopenia.  Improving  · MRSA PCR positive.     Plan:  Nephrology consulted:  Dr. Leung  Discussed with nephrology.  Appreciate input.  Resumed peritoneal dialysis;  peritoneal fluid culture negative , cell  count and gram stain noted.  P.O. Vanc x 10 days for C. difficile  We will add p.o. Flagyl because of ongoing diarrhea and spiking temperature.  Repeat blood culture because of febrile episode  Monitor H&H .... Patient refused blood transfusion  Continue p.o bicarb. as per nephrology  Cholestyramine for diarrhea as scheduled, poorly tolerated by patient  Stool culture negative  B12 and folic acid within normal range.  Resumed appropriate home medication.  Holding home Coreg due to bradycardia  CT of the chest and abdomen pelvis noted.  Discussed with patient  Replace electrolytes.  Trend procalcitonin ... Improving  Finished Diflucan instead of nystatin as per patient request to prevent a fungal peritonitis.  No treatment needed for COVID infection.  Patient has symptoms for 7 days and is not hypoxemic.  Discussed plan with RN.  DVT prophylaxis:  Mechanical DVT prophylaxis orders are present.  CODE STATUS:      Code Status (Patient has no pulse and is not breathing): CPR (Attempt to Resuscitate)  Medical Interventions (Patient has pulse or is breathing): Full Support         Attending documentation:  I reviewed the above documentation and independently reviewed and rounded and evaluated the patient and discussed the care plan with NYA De Leon PA-C, I agree with his findings and plan as documented, what I have added to the care plan and modified is as follows in my documentation and my medical decision making; Unfortunate 52-year-old female with ESRD on peritoneal dialysis, was hospitalized on 8/6/2022 with diarrhea symptoms, fevers, chills, recent COVID-19 diagnosis, diarrhea likely associated COVID-19 but also tested positive for C. difficile, placed on vancomycin, peritoneal dialysis associated peritonitis was ruled out, she remains on antibiotics including MRSA coverage as she was MRSA swab positive.  Interval follow-up: Patient seen and examined this morning, no acute distress, no acute major overnight events,  patient woke up from sleeping, she continues to spike fevers, last fever at 3 AM this morning 101.2 °F.  She continues to have shortness of breath symptoms and nasal discharge.  She reports diarrhea is subsiding.  She has difficulty tolerating oral intake of liquid vancomycin.  She reports 2 episodes of vomiting over the past 24 hours.  Denies chest pain or palpitations, denies any abdominal pain or cramping abdominal symptoms.  Review of system obtained, all systems reviewed and negative except for generalized fatigue, generalized weakness, medication intolerance, intermittent nausea.  Vitals reviewed, procalcitonin trending down.  Hemoglobin stable, on physical exam, well-appearing female, no acute distress, no acute major overnight events, patient seen lying in bed, regular rate rhythm, clear to auscultation bilaterally, soft nontender abdomen, PD catheter in place, no purulence, trace lower extreme edema.  Assessment as above, plan is continue peritoneal dialysis, continue oral vancomycin, with added Flagyl, repeat blood cultures, replacing electrolytes accordingly, nephrology inputs appreciated from Dr. Leung, fever spikes could still be from residual COVID-19 symptoms, a.m. labs, full code, DVT prophylaxis with SCDs, clinical course dictate further management, discussed with nurse at the bedside.  More than 65% of the time of this patient's encounter was performed by me, this included face-to-face time, planning and coordinating, medical decision making and critical thinking personally done by me.  -CURT LYNNE    Electronically signed by Tyler Rodríguez MD, 08/11/22, 3:40 PM EDT.

## 2022-08-11 NOTE — PLAN OF CARE
Goal Outcome Evaluation:    Problem: Diarrhea  Goal: Fluid and Electrolyte Balance  Outcome: Ongoing, Progressing  Intervention: Manage Diarrhea  Recent Flowsheet Documentation  Taken 8/11/2022 0928 by Ca Winchester, RN  Medication Review/Management:   high-risk medications identified   medications reviewed  Isolation Precautions:   precautions maintained   enhanced contact   spore  Taken 8/11/2022 0845 by Ca Winchester, RN  Medication Review/Management:   high-risk medications identified   medications reviewed  Isolation Precautions:   precautions maintained   enhanced contact   spore  Taken 8/11/2022 0730 by Ca Winchester, RN  Medication Review/Management:   high-risk medications identified   medications reviewed  Isolation Precautions:   precautions maintained   enhanced contact   spore

## 2022-08-12 LAB
ALBUMIN SERPL-MCNC: 3.5 G/DL (ref 3.5–5.2)
ALP SERPL-CCNC: 64 U/L (ref 39–117)
ALT SERPL W P-5'-P-CCNC: 17 U/L (ref 1–33)
ANION GAP SERPL CALCULATED.3IONS-SCNC: 19.8 MMOL/L (ref 5–15)
AST SERPL-CCNC: 24 U/L (ref 1–32)
BASOPHILS # BLD AUTO: 0.01 10*3/MM3 (ref 0–0.2)
BASOPHILS NFR BLD AUTO: 0.2 % (ref 0–1.5)
BILIRUB CONJ SERPL-MCNC: 0.2 MG/DL (ref 0–0.3)
BILIRUB INDIRECT SERPL-MCNC: 0.2 MG/DL
BILIRUB SERPL-MCNC: 0.4 MG/DL (ref 0–1.2)
BUN SERPL-MCNC: 54 MG/DL (ref 6–20)
BUN/CREAT SERPL: 5.1 (ref 7–25)
CALCIUM SPEC-SCNC: 9.5 MG/DL (ref 8.6–10.5)
CHLORIDE SERPL-SCNC: 96 MMOL/L (ref 98–107)
CO2 SERPL-SCNC: 25.2 MMOL/L (ref 22–29)
CREAT SERPL-MCNC: 10.59 MG/DL (ref 0.57–1)
DEPRECATED RDW RBC AUTO: 45 FL (ref 37–54)
EGFRCR SERPLBLD CKD-EPI 2021: 4 ML/MIN/1.73
EOSINOPHIL # BLD AUTO: 0.12 10*3/MM3 (ref 0–0.4)
EOSINOPHIL NFR BLD AUTO: 2.4 % (ref 0.3–6.2)
ERYTHROCYTE [DISTWIDTH] IN BLOOD BY AUTOMATED COUNT: 12.5 % (ref 12.3–15.4)
GLUCOSE SERPL-MCNC: 104 MG/DL (ref 65–99)
HCT VFR BLD AUTO: 23.2 % (ref 34–46.6)
HGB BLD-MCNC: 7.8 G/DL (ref 12–15.9)
IMM GRANULOCYTES # BLD AUTO: 0.02 10*3/MM3 (ref 0–0.05)
IMM GRANULOCYTES NFR BLD AUTO: 0.4 % (ref 0–0.5)
LYMPHOCYTES # BLD AUTO: 0.74 10*3/MM3 (ref 0.7–3.1)
LYMPHOCYTES NFR BLD AUTO: 14.8 % (ref 19.6–45.3)
MAGNESIUM SERPL-MCNC: 2.1 MG/DL (ref 1.6–2.6)
MCH RBC QN AUTO: 33.5 PG (ref 26.6–33)
MCHC RBC AUTO-ENTMCNC: 33.6 G/DL (ref 31.5–35.7)
MCV RBC AUTO: 99.6 FL (ref 79–97)
MONOCYTES # BLD AUTO: 0.28 10*3/MM3 (ref 0.1–0.9)
MONOCYTES NFR BLD AUTO: 5.6 % (ref 5–12)
NEUTROPHILS NFR BLD AUTO: 3.82 10*3/MM3 (ref 1.7–7)
NEUTROPHILS NFR BLD AUTO: 76.6 % (ref 42.7–76)
NRBC BLD AUTO-RTO: 0 /100 WBC (ref 0–0.2)
PHOSPHATE SERPL-MCNC: 6.3 MG/DL (ref 2.5–4.5)
PLATELET # BLD AUTO: 203 10*3/MM3 (ref 140–450)
PMV BLD AUTO: 10.3 FL (ref 6–12)
POTASSIUM SERPL-SCNC: 3.3 MMOL/L (ref 3.5–5.2)
PROT SERPL-MCNC: 6.7 G/DL (ref 6–8.5)
RBC # BLD AUTO: 2.33 10*6/MM3 (ref 3.77–5.28)
SODIUM SERPL-SCNC: 141 MMOL/L (ref 136–145)
WBC NRBC COR # BLD: 4.99 10*3/MM3 (ref 3.4–10.8)

## 2022-08-12 PROCEDURE — 84100 ASSAY OF PHOSPHORUS: CPT | Performed by: FAMILY MEDICINE

## 2022-08-12 PROCEDURE — 80048 BASIC METABOLIC PNL TOTAL CA: CPT | Performed by: FAMILY MEDICINE

## 2022-08-12 PROCEDURE — 25010000002 ONDANSETRON PER 1 MG: Performed by: INTERNAL MEDICINE

## 2022-08-12 PROCEDURE — 80076 HEPATIC FUNCTION PANEL: CPT | Performed by: FAMILY MEDICINE

## 2022-08-12 PROCEDURE — 63710000001 DIPHENHYDRAMINE PER 50 MG: Performed by: INTERNAL MEDICINE

## 2022-08-12 PROCEDURE — 85025 COMPLETE CBC W/AUTO DIFF WBC: CPT | Performed by: FAMILY MEDICINE

## 2022-08-12 PROCEDURE — 83735 ASSAY OF MAGNESIUM: CPT | Performed by: FAMILY MEDICINE

## 2022-08-12 PROCEDURE — 99232 SBSQ HOSP IP/OBS MODERATE 35: CPT | Performed by: FAMILY MEDICINE

## 2022-08-12 RX ORDER — POTASSIUM CHLORIDE 750 MG/1
20 CAPSULE, EXTENDED RELEASE ORAL ONCE
Status: COMPLETED | OUTPATIENT
Start: 2022-08-12 | End: 2022-08-12

## 2022-08-12 RX ORDER — SODIUM BICARBONATE 650 MG/1
650 TABLET ORAL 2 TIMES DAILY
Status: DISCONTINUED | OUTPATIENT
Start: 2022-08-12 | End: 2022-08-13 | Stop reason: HOSPADM

## 2022-08-12 RX ADMIN — CALCIUM ACETATE 667 MG: 667 CAPSULE ORAL at 08:48

## 2022-08-12 RX ADMIN — DIPHENHYDRAMINE HYDROCHLORIDE 25 MG: 25 CAPSULE ORAL at 14:07

## 2022-08-12 RX ADMIN — VANCOMYCIN HYDROCHLORIDE 125 MG: 125 CAPSULE ORAL at 23:02

## 2022-08-12 RX ADMIN — METRONIDAZOLE 500 MG: 500 TABLET ORAL at 06:55

## 2022-08-12 RX ADMIN — SODIUM CHLORIDE, SODIUM LACTATE, CALCIUM CHLORIDE, MAGNESIUM CHLORIDE AND DEXTROSE 1750 ML: 1.5; 538; 448; 18.3; 5.08 INJECTION, SOLUTION INTRAPERITONEAL at 06:25

## 2022-08-12 RX ADMIN — Medication 10 ML: at 08:36

## 2022-08-12 RX ADMIN — POTASSIUM CHLORIDE 20 MEQ: 750 CAPSULE, EXTENDED RELEASE ORAL at 18:33

## 2022-08-12 RX ADMIN — SODIUM BICARBONATE 650 MG TABLET 650 MG: at 08:35

## 2022-08-12 RX ADMIN — DIPHENHYDRAMINE HYDROCHLORIDE 25 MG: 25 CAPSULE ORAL at 20:29

## 2022-08-12 RX ADMIN — SODIUM CHLORIDE, SODIUM LACTATE, CALCIUM CHLORIDE, MAGNESIUM CHLORIDE AND DEXTROSE 1750 ML: 1.5; 538; 448; 18.3; 5.08 INJECTION, SOLUTION INTRAPERITONEAL at 18:32

## 2022-08-12 RX ADMIN — SODIUM CHLORIDE, SODIUM LACTATE, CALCIUM CHLORIDE, MAGNESIUM CHLORIDE AND DEXTROSE 1500 ML: 1.5; 538; 448; 18.3; 5.08 INJECTION, SOLUTION INTRAPERITONEAL at 22:28

## 2022-08-12 RX ADMIN — ALLOPURINOL 100 MG: 100 TABLET ORAL at 08:35

## 2022-08-12 RX ADMIN — Medication 250 MG: at 08:35

## 2022-08-12 RX ADMIN — VANCOMYCIN HYDROCHLORIDE 125 MG: 125 CAPSULE ORAL at 18:33

## 2022-08-12 RX ADMIN — ONDANSETRON 4 MG: 2 INJECTION INTRAMUSCULAR; INTRAVENOUS at 20:30

## 2022-08-12 RX ADMIN — SODIUM BICARBONATE 650 MG TABLET 650 MG: at 20:29

## 2022-08-12 RX ADMIN — SODIUM CHLORIDE, SODIUM LACTATE, CALCIUM CHLORIDE, MAGNESIUM CHLORIDE AND DEXTROSE 1750 ML: 1.5; 538; 448; 18.3; 5.08 INJECTION, SOLUTION INTRAPERITONEAL at 11:31

## 2022-08-12 RX ADMIN — CALCIUM ACETATE 667 MG: 667 CAPSULE ORAL at 14:07

## 2022-08-12 RX ADMIN — ACETAMINOPHEN 325MG 650 MG: 325 TABLET ORAL at 20:30

## 2022-08-12 RX ADMIN — CALCIUM ACETATE 667 MG: 667 CAPSULE ORAL at 18:33

## 2022-08-12 RX ADMIN — DIPHENHYDRAMINE HYDROCHLORIDE 25 MG: 25 CAPSULE ORAL at 08:35

## 2022-08-12 RX ADMIN — VANCOMYCIN HYDROCHLORIDE 125 MG: 125 CAPSULE ORAL at 06:55

## 2022-08-12 RX ADMIN — GENTAMICIN SULFATE 1 APPLICATION: 1 OINTMENT TOPICAL at 08:35

## 2022-08-12 RX ADMIN — VANCOMYCIN HYDROCHLORIDE 125 MG: 125 CAPSULE ORAL at 11:54

## 2022-08-12 RX ADMIN — POTASSIUM CHLORIDE 20 MEQ: 750 CAPSULE, EXTENDED RELEASE ORAL at 08:48

## 2022-08-12 RX ADMIN — FAMOTIDINE 10 MG: 10 TABLET ORAL at 20:30

## 2022-08-12 RX ADMIN — Medication 250 MG: at 20:29

## 2022-08-12 RX ADMIN — Medication 10 ML: at 20:30

## 2022-08-12 RX ADMIN — SODIUM CHLORIDE, SODIUM LACTATE, CALCIUM CHLORIDE, MAGNESIUM CHLORIDE AND DEXTROSE 1500 ML: 1.5; 538; 448; 18.3; 5.08 INJECTION, SOLUTION INTRAPERITONEAL at 14:14

## 2022-08-12 RX ADMIN — METRONIDAZOLE 500 MG: 500 TABLET ORAL at 16:18

## 2022-08-12 RX ADMIN — METRONIDAZOLE 500 MG: 500 TABLET ORAL at 22:29

## 2022-08-12 NOTE — PROGRESS NOTES
[facility]   Hospitalist Progress Note       Patient Name: Joelle Robison  : 1970  MRN: [id number]  Primary Care Physician: Refugio Shen MD  Date of admission: 2022  Today's Date: 2022  Room / Bed:   3017  Subjective   Chief Complaint:  Diarrhea.    Summary:  Joelle Robison is a 52 y.o. female with past medical history of end-stage renal disease due to polycystic kidney disease on home peritoneal dialysis by Dr. Leung, anemia of chronic kidney disease, hypertension and chronic systolic heart failure.  Patient recently came back from [place] about a week ago and contracted COVID since then.  Patient initially started with fever and chills.  Later on developed diarrhea and vomiting.  She is having frequent watery stools without any blood in it multiple times a day.  Also multiple episodes of nonbloody vomiting not able to keep anything down with decreased appetite.  Does have some nausea.  Her last vomiting was yesterday.  Last diarrhea was today..  She has mild right lower quadrant abdominal pain.  Patient has not able to peritoneal dialysis for past 2 days.  Patient has not been able to do her peritoneal dialysis for past 2 days because of this reason  Patient denies any chest pain, shortness of breath, cough, loss of taste or smell.  Patient presented to outside facility and was transferred to our hospital because of her peritoneal dialysis care.      She denies any urinary symptoms.  Denies any dizziness.  Patient denies any possible bad food exposure or recent use of antibiotics.  No other family member sick with vomiting or diarrhea.  Patient has not noticed any cloudiness in her peritoneal fluid.  Stool C. difficile came back positive.  First episode for the patient    Interval Followup: 2022    • Diarrhea improved.  Still some N/V poor oral intake.  Mild abd cramping.   • Discussed disposition:  Pt still feels below par / weak.  Disposition 24-48 hours  likely.  • Peritoneal fluid cultures negative.  Minimal WBCs.  Does not appear to be peritonitis.  • Fever trending favorably:  T99 this a.m. ([id number])  • Remains on room air  • Normotensive        REVIEW OF SYSTEMS: All other systems reviewed and are negative.   • GEN: No fevers. No chills. No weight gain. No weight loss.     • HEENT:   No dysphagia/odynophagia. No visual disturbance.    • GI:    No N/V.  No abd pain. No diarrhea. No constipation.  No bloody/black tarry stools. No hematemesis.   • CV: No chest discomfort.  No palps. No lightheadedness. No syncope. No orthopnea/PND. No edema.   • RESP:    No cough. No wheeze.  No increased sputum. No hemoptysis. No CORNEJO.  • :   No dysuria or suprapubic discomfort. No frequency.No urgency. No hesitancy. No incontinence. No hematuria. No flank pain.    • MS:   No joint stiffness or arthralgias. No myalgias. No muscle weakness.    • SKIN:   No painful or pruritic rashes.  No skin discoloration.  • NEURO:  No focal numbness or weakness.  No headaches.  No ataxia. No slurred speech. No receptive/expressive aphasia.      • PSYCH:   No anxiety. No depression.  • ENDO:  No tremor, hair loss, heat or cold intolerance.  Objective   Temp:  [97.4 °F (36.3 °C)-99 °F (37.2 °C)] 97.8 °F (36.6 °C)  Heart Rate:  [70-85] 85  Resp:  [18] 18  BP: ()/(52-68) 101/68  PHYSICAL EXAM   • CON: WN. WD. NAD.   • EYES:  Sclera anicteric. EOMI. Normal conjunctiva.   • ENT:  Oropharyngeal mucosa without ulcers or thrush.    • NECK:  No thyromegaly. No stridor. Trachea midline.  • RESP:  CTA. No wheezes. No crackles.  No work of breathing or tachypnea.   • CV:  Rhythm regular. Rate WNL. No murmur noted.  No edema.  • GI:  Soft and nontender. Nondistended.  Bowel sounds present.   • EXT: Peripheral pulses intact.  No joint deformities or cyanosis.  • LYMPH:  No lymphedema noted.  No cervical lymphadenopathy.  • PSYCH:  Alert. Oriented. Normal affect and mood.  • NEURO:  CNII-XII  grossly intact. No dysarthria or aphasia. No unilateral weakness or paresthesia.  • SKIN: No chronic venous stasis changes or varicosities.  No cellulitis    Results from last 7 days   Lab Units 08/12/22  0613 08/11/22  0947 08/09/22  0603 08/08/22  0822 08/07/22  0607 08/06/22  1106   WBC 10*3/mm3 4.99 4.05 3.49 4.03 4.25 4.17   HEMOGLOBIN g/dL 7.8* 8.3* 7.1* 7.4* 7.5* 7.9*   HEMATOCRIT % 23.2* 24.7* 21.5* 21.4* 22.5* 23.7*   PLATELETS 10*3/mm3 203 186 117* 108* 90* 90*     Results from last 7 days   Lab Units 08/12/22  0613 08/11/22  0947 08/10/22  0938 08/09/22  0603 08/08/22  0822 08/07/22  0607 08/06/22  1106   SODIUM mmol/L 141 141 140 139 139 137 138   POTASSIUM mmol/L 3.3* 3.5 3.3* 3.3* 3.5 4.3 5.0   CO2 mmol/L 25.2 27.3 25.8 22.0 21.6* 19.5* 11.8*   CHLORIDE mmol/L 96* 96* 96* 97* 98 97* 100   ANION GAP mmol/L 19.8* 17.7* 18.2* 20.0* 19.4* 20.5* 26.2*   BUN mg/dL 54* 48* 54* 59* 62* 76* 77*   CREATININE mg/dL 10.59* 10.74* 10.82* 11.50* 11.42* 12.61* 13.46*   GLUCOSE mg/dL 104* 109* 113* 107* 118* 114* 96           RESULTS REVIEWED:  I have personally reviewed the results from the time of this admission to 8/12/2022 11:46 EDT and agree with these findings:  []  Laboratory  []  Microbiology  []  Radiology  []  EKG/Telemetry   []  Cardiology/Vascular   []  Pathology  []  Old records  []  Other:  Assessment / Plan   Assessment:    · COVID pneumonia without hypoxemia  · Gastroenteritis.  Likely from COVID.  Improving  · C. difficile associated diarrhea.  First episode  · End-stage renal disease on peritoneal dialysis.  (Missed dialysis for 2 days prior to  · Admission.)  · Polycystic kidney disease causing above.  · Anemia of chronic kidney disease.  On Epogen.  Refused blood transfusion  · Iron deficiency anemia.  Supplemented.    · Systolic CHF.  Stable.  · Hypertension.  · Anion gap acidosis.  Improved  · Hypomagnesemia.  Supplemented  · Thrombocytopenia.  Improving  · MRSA PCR positive, colonization?.      Plan:  Nephrology consulted:  Dr. Leung  Discussed with nephrology.  Appreciate input.  Resumed peritoneal dialysis;  peritoneal fluid culture negative , cell count and gram stain noted.  P.O. Vanc x 10 days for C. difficile  We will add p.o. Flagyl because of ongoing diarrhea and spiking temperature.  Repeat blood culture because of febrile episode  Monitor H&H .... Patient refused blood transfusion  Cholestyramine for diarrhea .... poorly tolerated by patient  Stool culture .... negative  B12 and folic acid ... within normal range.  Resumed appropriate home medication.  Holding home Coreg due to bradycardia  CT of the chest and abdomen pelvis noted.  Discussed with patient  Replace electrolytes.  Trend procalcitonin ... Improving  Finished Diflucan instead of nystatin as per patient request to prevent a fungal peritonitis.  No treatment needed for COVID infection.  Patient has symptoms for 7 days and is not hypoxemic.  Discussed plan with RN.  DVT prophylaxis:  Mechanical DVT prophylaxis orders are present.  CODE STATUS:      Code Status (Patient has no pulse and is not breathing): CPR (Attempt to Resuscitate)  Medical Interventions (Patient has pulse or is breathing): Full Support       \  Attending documentation:  I reviewed the above documentation and independently reviewed and rounded and evaluated the patient and discussed the care plan with NYA De Leon PA-C, I agree with his findings and plan as documented, what I have added to the care plan and modified is as follows in my documentation and my medical decision making; Unfortunate 52-year-old female with ESRD on peritoneal dialysis, was hospitalized on 8/6/2022 with diarrhea symptoms, fevers, chills, recent COVID-19 diagnosis, diarrhea likely associated COVID-19 but also tested positive for C. difficile, placed on vancomycin, peritoneal dialysis associated peritonitis was ruled out, she remains on antibiotics including MRSA coverage as she was MRSA swab  positive.  Interval follow-up: Patient seen and examined this morning, no acute distress, no acute major overnight events, still feels weak and fatigued, no new major symptoms, abdominal cramping or pain.  Tolerating oral intake.  Afebrile past 24 hours.  She continues to have nasal discharge.  She is tolerating oral vancomycin, she is not tolerating cholestyramine.  Denies chest pain or palpitations, denies any abdominal pain or cramping abdominal symptoms.  Review of system obtained, all systems reviewed and negative except for generalized fatigue, generalized weakness.  Vitals reviewed, on physical exam, well-appearing female, patient seen lying in bed, regular rate rhythm, clear to auscultation bilaterally, soft nontender abdomen, PD catheter in place, no purulence, trace lower extreme edema.  Assessment as above, plan is continue peritoneal dialysis, continue oral vancomycin, continuing Flagyl, repeat blood cultures, replacing electrolytes accordingly, nephrology inputs appreciated from Dr. Yunior a.m. labs, full code, DVT prophylaxis with SCDs, clinical course dictate further management, discussed with nurse at the bedside.  More than 70% of the time of this patient's encounter was performed by me, this included face-to-face time, planning and coordinating, medical decision making and critical thinking personally done by me.  -CURT LYNNE     Electronically signed by Tyler Rodríguez MD, 08/12/22, 5:17 PM EDT.    Dragon disclaimer:  Much of this encounter note is an electronic transcription/translation of spoken language to printed text. The electronic translation of spoken language may permit erroneous, or at times, nonsensical words or phrases to be inadvertently transcribed. Although I have reviewed the note for such errors, some may still exist.

## 2022-08-12 NOTE — PLAN OF CARE
Goal Outcome Evaluation:         Patient had no acute events overnight, pd continued, antibiotics continued, patient rested through night.

## 2022-08-12 NOTE — PROGRESS NOTES
[facility]     Nephrology Progress Note      Patient Name: Joelle Robison  : 1970  MRN: [id number]  Primary Care Physician:  Refugio Shen MD  Date of admission: 2022    Subjective   Subjective     Interval History:  Patient Reports feeling slightly better.  No more fever.  No chills.   Tolerating p.o. but poor appetite.  Diarrhea is better.   No issues with her peritoneal dialysis.  No abdominal pain.     Review of Systems   All systems were reviewed and negative except for: What is mentioned above.    Objective   Objective     Vitals:   Temp:  [97.4 °F (36.3 °C)-99 °F (37.2 °C)] 98 °F (36.7 °C)  Heart Rate:  [70-85] 76  Resp:  [18] 18  BP: ()/(52-68) 101/60  Physical Exam:    Constitutional: Awake, alert, not in distress, comfortable.              Eyes: sclerae anicteric, no conjunctival injection, pale.              HENT: mucous membranes moist              Neck: Supple, no thyromegaly, no lymphadenopathy, trachea midline, No JVD              Respiratory: Decreased to auscultation bilaterally, nonlabored respirations               Cardiovascular: RRR, no murmurs, rubs, or gallops.              Gastrointestinal: Positive bowel sounds, soft, nontender, full.  Nondistended PD catheter exit site is clean and covered.              Musculoskeletal: No edema, no clubbing or cyanosis.              Psychiatric: Appropriate affect, cooperative              Neurologic: Oriented x 3, moving all extremities, Cranial Nerves grossly intact, speech clear              Skin: warm and dry, no rashes     Result Review    Result Reviewed:  I have personally reviewed the results from the time of this admission to 2022 16:15 EDT and agree with these findings:  [x]  Laboratory  []  Microbiology  [x]  Radiology  []  EKG/Telemetry   []  Cardiology/Vascular   []  Pathology  []  Old records  []  Other:  Lab Results   Component Value Date    GLUCOSE 104 (H) 2022    CALCIUM 9.5 2022      08/12/2022    K 3.3 (L) 08/12/2022    CO2 25.2 08/12/2022    CL 96 (L) 08/12/2022    BUN 54 (H) 08/12/2022    CREATININE 10.59 (H) 08/12/2022    EGFRIFAFRI  01/25/2021      Comment:      <15 Indicative of kidney failure.    EGFRIFNONA 6 (L) 01/25/2021    BCR 5.1 (L) 08/12/2022    ANIONGAP 19.8 (H) 08/12/2022     Lab Results   Component Value Date    CALCIUM 9.5 08/12/2022    PHOS 6.3 (H) 08/12/2022      Results from last 7 days   Lab Units 08/12/22  0613   MAGNESIUM mg/dL 2.1              Assessment & Plan   Assessment / Plan       Active Hospital Problems:  Active Hospital Problems    Diagnosis    • Clostridium difficile diarrhea    • ESRD (end stage renal disease) ([facility])    • Gastroenteritis due to COVID-19 virus        Assessment and Plan:    - End-stage renal disease, on peritoneal dialysis,  5 exchanges per day with fill volume of 1750 mL 1.5% dextrose solution.  Well-tolerated.  PD fluid cell count is not compatible with peritonitis. Continue nystatin swish and swallow for fungal peritonitis prevention while on antibiotics.  Abdomen and pelvis CT scan without contrast showed possible proctitis/colitis in addition to PKD changes.   - History of polycystic kidney disease.  - Metabolic acidosis secondary to renal failure and diarrhea.  Much better.  Continue oral bicarb but decreased to twice daily as she was doing at home.  - Hypomagnesemia, Mag ok.  - Macrocytic anemia, .  Iron saturation 16% but ferritin is markedly elevated.  B12 and folates are ok.  Continue Epogen while she is here.  - COVID-positive with fever.  Symptomatically better.  - C.diff- seems better now. Empirically on oral Flagyl and vancomycin.  Per primary.  Diarrhea is improving.  - Cardiomyopathy may explain relative hypotension.  - Hyperphosphatemia secondary to renal failure.  Continue PhosLo 667 mg p.o. 3 times daily with meals.  - Hypokalemia-being replaced replaced po.  Monitor.  Will follow.

## 2022-08-13 ENCOUNTER — READMISSION MANAGEMENT (OUTPATIENT)
Dept: CALL CENTER | Facility: HOSPITAL | Age: 52
End: 2022-08-13

## 2022-08-13 VITALS
WEIGHT: 150.35 LBS | OXYGEN SATURATION: 95 % | DIASTOLIC BLOOD PRESSURE: 65 MMHG | BODY MASS INDEX: 27.67 KG/M2 | HEIGHT: 62 IN | TEMPERATURE: 97.7 F | SYSTOLIC BLOOD PRESSURE: 103 MMHG | HEART RATE: 89 BPM | RESPIRATION RATE: 18 BRPM

## 2022-08-13 LAB
ALBUMIN SERPL-MCNC: 3.6 G/DL (ref 3.5–5.2)
AMPHET+METHAMPHET UR QL: NEGATIVE
ANION GAP SERPL CALCULATED.3IONS-SCNC: 18.4 MMOL/L (ref 5–15)
BACTERIA UR QL AUTO: ABNORMAL /HPF
BARBITURATES UR QL SCN: NEGATIVE
BASOPHILS # BLD AUTO: 0.02 10*3/MM3 (ref 0–0.2)
BASOPHILS NFR BLD AUTO: 0.3 % (ref 0–1.5)
BENZODIAZ UR QL SCN: NEGATIVE
BILIRUB UR QL STRIP: NEGATIVE
BUN SERPL-MCNC: 52 MG/DL (ref 6–20)
BUN/CREAT SERPL: 4.8 (ref 7–25)
CALCIUM SPEC-SCNC: 10.2 MG/DL (ref 8.6–10.5)
CANNABINOIDS SERPL QL: NEGATIVE
CHLORIDE SERPL-SCNC: 96 MMOL/L (ref 98–107)
CLARITY UR: CLEAR
CO2 SERPL-SCNC: 25.6 MMOL/L (ref 22–29)
COCAINE UR QL: NEGATIVE
COLOR UR: YELLOW
CREAT SERPL-MCNC: 10.81 MG/DL (ref 0.57–1)
DEPRECATED RDW RBC AUTO: 44.9 FL (ref 37–54)
EGFRCR SERPLBLD CKD-EPI 2021: 3.9 ML/MIN/1.73
EOSINOPHIL # BLD AUTO: 0.24 10*3/MM3 (ref 0–0.4)
EOSINOPHIL NFR BLD AUTO: 4.1 % (ref 0.3–6.2)
ERYTHROCYTE [DISTWIDTH] IN BLOOD BY AUTOMATED COUNT: 12.6 % (ref 12.3–15.4)
GLUCOSE SERPL-MCNC: 101 MG/DL (ref 65–99)
GLUCOSE UR STRIP-MCNC: NEGATIVE MG/DL
HCT VFR BLD AUTO: 24.4 % (ref 34–46.6)
HGB BLD-MCNC: 8.4 G/DL (ref 12–15.9)
HGB UR QL STRIP.AUTO: ABNORMAL
HYALINE CASTS UR QL AUTO: ABNORMAL /LPF
IMM GRANULOCYTES # BLD AUTO: 0.03 10*3/MM3 (ref 0–0.05)
IMM GRANULOCYTES NFR BLD AUTO: 0.5 % (ref 0–0.5)
KETONES UR QL STRIP: NEGATIVE
L PNEUMO1 AG UR QL IA: NEGATIVE
LEUKOCYTE ESTERASE UR QL STRIP.AUTO: ABNORMAL
LYMPHOCYTES # BLD AUTO: 0.87 10*3/MM3 (ref 0.7–3.1)
LYMPHOCYTES NFR BLD AUTO: 14.8 % (ref 19.6–45.3)
MCH RBC QN AUTO: 34.1 PG (ref 26.6–33)
MCHC RBC AUTO-ENTMCNC: 34.4 G/DL (ref 31.5–35.7)
MCV RBC AUTO: 99.2 FL (ref 79–97)
METHADONE UR QL SCN: NEGATIVE
MONOCYTES # BLD AUTO: 0.41 10*3/MM3 (ref 0.1–0.9)
MONOCYTES NFR BLD AUTO: 7 % (ref 5–12)
NEUTROPHILS NFR BLD AUTO: 4.3 10*3/MM3 (ref 1.7–7)
NEUTROPHILS NFR BLD AUTO: 73.3 % (ref 42.7–76)
NITRITE UR QL STRIP: POSITIVE
NRBC BLD AUTO-RTO: 0 /100 WBC (ref 0–0.2)
OPIATES UR QL: NEGATIVE
OXYCODONE UR QL SCN: NEGATIVE
PH UR STRIP.AUTO: 6.5 [PH] (ref 5–8)
PHOSPHATE SERPL-MCNC: 5.7 MG/DL (ref 2.5–4.5)
PLATELET # BLD AUTO: 235 10*3/MM3 (ref 140–450)
PMV BLD AUTO: 10.1 FL (ref 6–12)
POTASSIUM SERPL-SCNC: 3.6 MMOL/L (ref 3.5–5.2)
PROT UR QL STRIP: ABNORMAL
RBC # BLD AUTO: 2.46 10*6/MM3 (ref 3.77–5.28)
RBC # UR STRIP: ABNORMAL /HPF
REF LAB TEST METHOD: ABNORMAL
S PNEUM AG SPEC QL LA: NEGATIVE
SODIUM SERPL-SCNC: 140 MMOL/L (ref 136–145)
SP GR UR STRIP: 1.01 (ref 1–1.03)
SQUAMOUS #/AREA URNS HPF: ABNORMAL /HPF
UROBILINOGEN UR QL STRIP: ABNORMAL
WBC # UR STRIP: ABNORMAL /HPF
WBC NRBC COR # BLD: 5.87 10*3/MM3 (ref 3.4–10.8)

## 2022-08-13 PROCEDURE — 80069 RENAL FUNCTION PANEL: CPT | Performed by: INTERNAL MEDICINE

## 2022-08-13 PROCEDURE — 80307 DRUG TEST PRSMV CHEM ANLYZR: CPT | Performed by: INTERNAL MEDICINE

## 2022-08-13 PROCEDURE — 81001 URINALYSIS AUTO W/SCOPE: CPT | Performed by: INTERNAL MEDICINE

## 2022-08-13 PROCEDURE — 63710000001 DIPHENHYDRAMINE PER 50 MG: Performed by: INTERNAL MEDICINE

## 2022-08-13 PROCEDURE — 85025 COMPLETE CBC W/AUTO DIFF WBC: CPT | Performed by: PHYSICIAN ASSISTANT

## 2022-08-13 PROCEDURE — 87449 NOS EACH ORGANISM AG IA: CPT | Performed by: INTERNAL MEDICINE

## 2022-08-13 PROCEDURE — 87899 AGENT NOS ASSAY W/OPTIC: CPT | Performed by: INTERNAL MEDICINE

## 2022-08-13 PROCEDURE — 99239 HOSP IP/OBS DSCHRG MGMT >30: CPT | Performed by: FAMILY MEDICINE

## 2022-08-13 RX ADMIN — METRONIDAZOLE 500 MG: 500 TABLET ORAL at 06:21

## 2022-08-13 RX ADMIN — VANCOMYCIN HYDROCHLORIDE 125 MG: 125 CAPSULE ORAL at 11:25

## 2022-08-13 RX ADMIN — Medication 250 MG: at 09:11

## 2022-08-13 RX ADMIN — CALCIUM ACETATE 667 MG: 667 CAPSULE ORAL at 11:24

## 2022-08-13 RX ADMIN — GENTAMICIN SULFATE 1 APPLICATION: 1 OINTMENT TOPICAL at 09:13

## 2022-08-13 RX ADMIN — Medication 10 ML: at 09:12

## 2022-08-13 RX ADMIN — VANCOMYCIN HYDROCHLORIDE 125 MG: 125 CAPSULE ORAL at 06:21

## 2022-08-13 RX ADMIN — SODIUM CHLORIDE, SODIUM LACTATE, CALCIUM CHLORIDE, MAGNESIUM CHLORIDE AND DEXTROSE 1500 ML: 1.5; 538; 448; 18.3; 5.08 INJECTION, SOLUTION INTRAPERITONEAL at 06:21

## 2022-08-13 RX ADMIN — CALCIUM ACETATE 667 MG: 667 CAPSULE ORAL at 09:12

## 2022-08-13 RX ADMIN — ALLOPURINOL 100 MG: 100 TABLET ORAL at 09:12

## 2022-08-13 RX ADMIN — DIPHENHYDRAMINE HYDROCHLORIDE 25 MG: 25 CAPSULE ORAL at 06:21

## 2022-08-13 RX ADMIN — SODIUM BICARBONATE 650 MG TABLET 650 MG: at 09:11

## 2022-08-13 NOTE — PROGRESS NOTES
[facility]   Hospitalist Progress Note       Patient Name: Joelle Robison  : 1970  MRN: [id number]  Primary Care Physician: Refugio Shen MD  Date of admission: 2022  Today's Date: 2022  Room / Bed:   3017  Subjective   Chief Complaint:  Diarrhea.    Summary:  Joelle Robison is a 52 y.o. female with past medical history of end-stage renal disease due to polycystic kidney disease on home peritoneal dialysis by Dr. Leung, anemia of chronic kidney disease, hypertension and chronic systolic heart failure.  Patient recently came back from [place] about a week ago and contracted COVID since then.  Patient initially started with fever and chills.  Later on developed diarrhea and vomiting.  She is having frequent watery stools without any blood in it multiple times a day.  Also multiple episodes of nonbloody vomiting not able to keep anything down with decreased appetite.  Does have some nausea.  Her last vomiting was yesterday.  Last diarrhea was today..  She has mild right lower quadrant abdominal pain.  Patient has not able to peritoneal dialysis for past 2 days.  Patient has not been able to do her peritoneal dialysis for past 2 days because of this reason  Patient denies any chest pain, shortness of breath, cough, loss of taste or smell.  Patient presented to outside facility and was transferred to our hospital because of her peritoneal dialysis care.      She denies any urinary symptoms.  Denies any dizziness.  Patient denies any possible bad food exposure or recent use of antibiotics.  No other family member sick with vomiting or diarrhea.  Patient has not noticed any cloudiness in her peritoneal fluid.  Stool C. difficile came back positive.  First episode for the patient    Interval Followup: 2022    • Diarrhea resolved  • Nausea and vomiting resolved  • Fever resolved  • Patient feeling much better and is eager to return home today  • Remains on room  air  • Normotensive        REVIEW OF SYSTEMS: All other systems reviewed and are negative.   • GEN: No fevers. No chills. No weight gain. No weight loss.     • HEENT:   No dysphagia/odynophagia. No visual disturbance.    • GI:    No N/V.  No abd pain. No diarrhea. No constipation.  No bloody/black tarry stools. No hematemesis.   • CV: No chest discomfort.  No palps. No lightheadedness. No syncope. No orthopnea/PND. No edema.   • RESP:    No cough. No wheeze.  No increased sputum. No hemoptysis. No CORNEJO.  • :   No dysuria or suprapubic discomfort. No frequency.No urgency. No hesitancy. No incontinence. No hematuria. No flank pain.    • MS:   No joint stiffness or arthralgias. No myalgias. No muscle weakness.    • SKIN:   No painful or pruritic rashes.  No skin discoloration.  • NEURO:  No focal numbness or weakness.  No headaches.  No ataxia. No slurred speech. No receptive/expressive aphasia.      • PSYCH:   No anxiety. No depression.  • ENDO:  No tremor, hair loss, heat or cold intolerance.  Objective   Temp:  [97.9 °F (36.6 °C)-98.1 °F (36.7 °C)] 97.9 °F (36.6 °C)  Heart Rate:  [72-87] 87  Resp:  [18] 18  BP: ()/(60-66) 107/66  PHYSICAL EXAM   • CON: WN. WD. NAD.   • EYES:  Sclera anicteric. EOMI. Normal conjunctiva.   • ENT:  Oropharyngeal mucosa without ulcers or thrush.    • NECK:  No thyromegaly. No stridor. Trachea midline.  • RESP:  CTA. No wheezes. No crackles.  No work of breathing or tachypnea.   • CV:  Rhythm regular. Rate WNL. No murmur noted.  No edema.  • GI:  Soft and nontender. Nondistended.  Bowel sounds present.   • EXT: Peripheral pulses intact.  No joint deformities or cyanosis.  • LYMPH:  No lymphedema noted.  No cervical lymphadenopathy.  • PSYCH:  Alert. Oriented. Normal affect and mood.  • NEURO:  CNII-XII grossly intact. No dysarthria or aphasia. No unilateral weakness or paresthesia.  • SKIN: No chronic venous stasis changes or varicosities.  No cellulitis    Results from last 7  days   Lab Units 08/13/22  1018 08/12/22  0613 08/11/22  0947 08/09/22  0603 08/08/22  0822 08/07/22  0607   WBC 10*3/mm3 5.87 4.99 4.05 3.49 4.03 4.25   HEMOGLOBIN g/dL 8.4* 7.8* 8.3* 7.1* 7.4* 7.5*   HEMATOCRIT % 24.4* 23.2* 24.7* 21.5* 21.4* 22.5*   PLATELETS 10*3/mm3 235 203 186 117* 108* 90*     Results from last 7 days   Lab Units 08/13/22  0619 08/12/22  0613 08/11/22  0947 08/10/22  0938 08/09/22  0603 08/08/22  0822 08/07/22  0607   SODIUM mmol/L 140 141 141 140 139 139 137   POTASSIUM mmol/L 3.6 3.3* 3.5 3.3* 3.3* 3.5 4.3   CO2 mmol/L 25.6 25.2 27.3 25.8 22.0 21.6* 19.5*   CHLORIDE mmol/L 96* 96* 96* 96* 97* 98 97*   ANION GAP mmol/L 18.4* 19.8* 17.7* 18.2* 20.0* 19.4* 20.5*   BUN mg/dL 52* 54* 48* 54* 59* 62* 76*   CREATININE mg/dL 10.81* 10.59* 10.74* 10.82* 11.50* 11.42* 12.61*   GLUCOSE mg/dL 101* 104* 109* 113* 107* 118* 114*           RESULTS REVIEWED:  I have personally reviewed the results from the time of this admission to 8/13/2022 11:29 EDT and agree with these findings:  []  Laboratory  []  Microbiology  []  Radiology  []  EKG/Telemetry   []  Cardiology/Vascular   []  Pathology  []  Old records  []  Other:  Assessment / Plan   Assessment:    · COVID pneumonia without hypoxemia  · Gastroenteritis.  Likely from COVID.  Improving  · C. difficile associated diarrhea.  First episode  · End-stage renal disease on peritoneal dialysis.  (Missed dialysis for 2 days prior to  · Admission.)  · Polycystic kidney disease causing above.  · Anemia of chronic kidney disease.  On Epogen.  Refused blood transfusion  · Iron deficiency anemia.  Supplemented.    · Systolic CHF.  Stable.  · Hypertension.  · Anion gap acidosis.  Improved  · Hypomagnesemia.  Supplemented  · Thrombocytopenia.  Improving  · MRSA PCR positive, colonization?.     Plan:  Home with home health  Need to complete a 10 day course of oral vancomycin  Follow up with PCP  Follow up with Nephrology      Discussed plan with RN.  DVT  prophylaxis:  Mechanical DVT prophylaxis orders are present.  CODE STATUS:      Code Status (Patient has no pulse and is not breathing): CPR (Attempt to Resuscitate)  Medical Interventions (Patient has pulse or is breathing): Full Support         Attending documentation:  I reviewed the above documentation and independently reviewed and rounded and evaluated the patient and discussed the care plan with NYA De Leon PA-C, I agree with his findings and plan as documented, what I have added to the care plan and modified is as follows in my documentation and my medical decision making and discharge plan; Unfortunate 52-year-old female with ESRD on peritoneal dialysis, was hospitalized on 8/6/2022 with diarrhea symptoms, fevers, chills, recent COVID-19 diagnosis, diarrhea likely associated COVID-19 but also tested positive for C. difficile, placed on vancomycin, peritoneal dialysis associated peritonitis was ruled out, she remains on antibiotics including MRSA coverage as she was MRSA swab positive.  Several days of symptom management, diarrhea improved, nasal congestion improved, she started tolerating oral intake better.  With improving weakness and fatigue, she was discharged in hemodynamically stable condition on 8/13/2022 to complete course of oral vancomycin and to follow-up with nephrology as scheduled.  Patient seen on day of discharge, no acute distress, no acute major overnight events, reported improving weakness and fatigued, no new major symptoms, tolerating oral intake better.  Afebrile past 48 hours. Denies chest pain or palpitations, denies any abdominal pain or cramping abdominal symptoms.  Review of system obtained, all systems reviewed and negative except for generalized fatigue, generalized weakness.  Vitals reviewed, on physical exam, well-appearing female, patient seen lying in bed, regular rate rhythm, clear to auscultation bilaterally, soft nontender abdomen, PD catheter in place, no purulence in  tubing, trace lower extreme edema.  Discharge plan reviewed with patient, all questions answered.  Total discharge time 43 minutes.  More than 72% of the time of this patient's encounter was performed by me, this included face-to-face time, planning and coordinating, medical decision making and critical thinking personally done by me.  -CURT LYNNE    Electronically signed by Tyler Rodríguez MD, 08/13/22, 3:15 PM EDT.       Mian disclaimer:  Much of this encounter note is an electronic transcription/translation of spoken language to printed text. The electronic translation of spoken language may permit erroneous, or at times, nonsensical words or phrases to be inadvertently transcribed. Although I have reviewed the note for such errors, some may still exist.

## 2022-08-13 NOTE — DISCHARGE SUMMARY
[facility]  HOSPITALIST  DISCHARGE SUMMARY       Patient Name: Joelle Robison  : 1970  MRN: [id number]  Primary Care Physician: Refugio Shen MD    Date of Admission: 2022  Date of Discharge: 2022    Discharge Diagnoses     COVID pneumonia without hypoxemia  Gastroenteritis.  Likely from COVID.  Improving  C. difficile associated diarrhea.  First episode  End-stage renal disease on peritoneal dialysis.  (Missed dialysis for 2 days prior to  Admission.)  Polycystic kidney disease causing above.  Anemia of chronic kidney disease.  On Epogen.  Refused blood transfusion  Iron deficiency anemia.  Supplemented.    Systolic CHF.  Stable.  Hypertension.  Anion gap acidosis.  Improved  Hypomagnesemia.  Supplemented  Thrombocytopenia.  Improving  MRSA PCR positive, colonization?.    Hospital Course   Hospital Course:  Joelle Robison is a pleasant 52 y.o. female who was admitted for fever, chills, diarrhea and nausea/vomiting.  She had missed 2 home peritoneal dialysis sessions as well.  Upon admission, her nephrologist was consulted and peritoneal dialysis was continued while in hospital.  Her stool cultures/PCR were positive for C difficile.  Her peritoneal fluid analysis was reviewed and not consistent with peritonitis.  She was continued on vancomycin.  Also had Flagyl ordered in light of initial severity of illness.  Over there following days, she had resolution of her diarrhea.  She remained medically stable.  Her Hgb remained in the 7-8 g/dl range.  She received Epogen while here.  Over time, she had resolution of her fever. Her nausea and vomiting resolved and she was tolerating p.o.  She is eager to return home at this time. She still has a few days left of her 10-day course of oral vancomycin.  Rx sent to her pharmacy.  She is to follow up with her PCP in 1 week and her nephrologist 2 weeks, sooner if needed.    Discharge Follow Up / Recommendations (labs, diagnostics, meds,  etc):   As above  Consultants     Consults     Date and Time Order Name Status Description    8/6/2022  9:45 AM Inpatient Nephrology Consult Completed         On Day of Discharge   VS: Temp:  [97.9 °F (36.6 °C)-98.1 °F (36.7 °C)] 97.9 °F (36.6 °C)  Heart Rate:  [72-87] 87  Resp:  [18] 18  BP: ()/(60-66) 107/66  EXAM:  (refer to progress note from 8/13/2022)     Discharge Medications      New Medications      Instructions Start Date   vancomycin 125 MG capsule  Commonly known as: VANCOCIN   125 mg, Oral, 4 Times Daily         Continue These Medications      Instructions Start Date   allopurinol 100 MG tablet  Commonly known as: ZYLOPRIM   100 mg, Oral, Daily      calcium acetate 667 MG capsule capsule  Commonly known as: PHOS BINDER)   1,334 mg, Oral, 3 Times Daily With Meals      carvedilol 3.125 MG tablet  Commonly known as: COREG   3.125 mg, Oral, 2 Times Daily      furosemide 80 MG tablet  Commonly known as: LASIX   80 mg, Oral, 2 Times Daily      omeprazole 20 MG capsule  Commonly known as: priLOSEC   20 mg, Oral, Daily      sodium bicarbonate 650 MG tablet   650 mg, Oral, 2 Times Daily      spironolactone 25 MG tablet  Commonly known as: ALDACTONE   25 mg, Oral, Daily           Procedures   Peritoneal dialysis  Imaging       CT Abdomen Pelvis Without Contrast    Result Date: 8/6/2022  PROCEDURE: CT ABDOMEN PELVIS WO CONTRAST  COMPARISON: None  INDICATIONS: End-stage renal disease vomiting diarrhea  PROTOCOL:   Standard imaging protocol performed    RADIATION:   DLP: 550mGy*cm   Automated exposure control was utilized to minimize radiation dose.  TECHNIQUE: Axial images of the abdomen and pelvis with oral contrast.  ABDOMEN:  There are multiple hepatic cysts.  There are innumerable renal cysts.  There is bilateral renal enlargement.  The findings are consistent with polycystic kidney disease.  The unenhanced spleen, pancreas and adrenal glands are normal.  There is a small hiatal hernia.  There are no  inflammatory changes around the gallbladder.  PELVIS:  No evidence of bowel obstruction, perforation or abscess.  There is a peritoneal dialysis catheter.  There is a small amount of free fluid.  There is mild colonic diverticulosis.  There is mild wall thickening in the distal rectum.  No CT evidence of diverticulitis.  The abdominal aorta has a normal caliber.  Atherosclerotic disease is present.  No acute osseous abnormalities are identified.  The uterus and adnexa have a normal unenhanced CT appearance.  IMPRESSION:   1. Mild wall thickening in the distal rectum possibly secondary to spasm or proctitis.  Suggest follow-up endoscopy to exclude a mass.  No evidence of bowel obstruction, perforation or abscess.  2. Polycystic kidney disease.   NILDA GALLARDO MD       Electronically Signed and Approved By: NILDA GALLARDO MD on 8/06/2022 at 16:41             CT Chest Without Contrast Diagnostic    Result Date: 8/6/2022  PROCEDURE: CT CHEST WO CONTRAST DIAGNOSTIC  COMPARISON: [facility], CR, XR CHEST 1 VW, 8/06/2022, 10:20.  INDICATIONS: Pneumonia  PROTOCOL:   Standard imaging protocol performed    RADIATION:   DLP: 550mGy*cm   Automated exposure control was utilized to minimize radiation dose.  TECHNIQUE: Axial images of the chest without intravenous contrast.  FINDINGS: The left ventricle is enlarged.  No evidence of pleural or pericardial effusion.  No evidence of pneumothorax.  The thoracic aorta has a normal caliber.  Moderate multifocal ground-glass opacity is present in the lung fields.  There is no mediastinal, axillary or hilar adenopathy.  Mild degenerative changes are present in the thoracic spine.  IMPRESSION:  1. Left ventricular enlargement.  2. Moderate multifocal ground-glass opacity in the lung fields suspicious for multifocal pneumonia such as COVID-19 infection.   NILDA GALLARDO MD       Electronically Signed and Approved By: NILDA GALLARDO MD on 8/06/2022 at 16:36              XR Chest 1 View    Result Date: 8/6/2022  PROCEDURE: XR CHEST 1 VW  COMPARISON: None  INDICATIONS: COVID +, NAUSEA, VOMITING  FINDINGS:   The lungs are well-expanded. The heart and pulmonary vasculature are within normal limits. No pleural effusions are identified. There moderate patchy airspace opacity throughout the lung fields.  IMPRESSION:  Moderate patchy airspace opacity throughout the lung fields consistent with multifocal pneumonia/COVID-19 infection.  NILDA GALLARDO MD       Electronically Signed and Approved By: NILDA GALLARDO MD on 8/06/2022 at 10:35             Discharge Details   Hospital Diet:     Diet Order   Procedures   • Diet Regular; Renal     CODE STATUS:    Code Status and Medical Interventions:   Ordered at: 08/06/22 0945     Code Status (Patient has no pulse and is not breathing):    CPR (Attempt to Resuscitate)     Medical Interventions (Patient has pulse or is breathing):    Full Support     Additional Instructions for the Follow-ups that You Need to Schedule     Discharge Follow-up with PCP   As directed       Currently Documented PCP:    Refugio Shen MD    PCP Phone Number:    [phone]     Follow Up Details: 1 week         Discharge Follow-up with Specified Provider: Dr. Leung 2 weeks; 2 Weeks   As directed      To: Dr. Leung 2 weeks    Follow Up: 2 Weeks             Discharge Disposition: Home-Health Care [facility] [place]  Pertinent  Labs   LAB RESULTS:      Lab 08/13/22  1018 08/12/22  0613 08/11/22  0947 08/10/22  0938 08/09/22  0603 08/08/22  0822 08/07/22  0607   WBC 5.87 4.99 4.05  --  3.49 4.03 4.25   HEMOGLOBIN 8.4* 7.8* 8.3*  --  7.1* 7.4* 7.5*   HEMATOCRIT 24.4* 23.2* 24.7*  --  21.5* 21.4* 22.5*   PLATELETS 235 203 186  --  117* 108* 90*   NEUTROS ABS 4.30 3.82 3.02  --  2.84  --  3.17   IMMATURE GRANS (ABS) 0.03 0.02 0.03  --  0.03  --  0.02   LYMPHS ABS 0.87 0.74 0.71  --  0.46*  --  0.82   MONOS ABS 0.41 0.28 0.23  --  0.14  --  0.22   EOS ABS 0.24 0.12 0.05  --  0.02   --  0.01   MCV 99.2* 99.6* 100.0*  --  101.9* 98.6* 102.3*   PROCALCITONIN  --   --   --  10.08*  --  16.77* 20.98*         Lab 08/13/22 0619 08/12/22 0613 08/11/22 0947 08/10/22  09 08/09/22 0603 08/08/22 0822   SODIUM 140 141 141 140 139 139   POTASSIUM 3.6 3.3* 3.5 3.3* 3.3* 3.5   CHLORIDE 96* 96* 96* 96* 97* 98   CO2 25.6 25.2 27.3 25.8 22.0 21.6*   ANION GAP 18.4* 19.8* 17.7* 18.2* 20.0* 19.4*   BUN 52* 54* 48* 54* 59* 62*   CREATININE 10.81* 10.59* 10.74* 10.82* 11.50* 11.42*   EGFR 3.9* 4.0* 3.9* 3.9* 3.6* 3.6*   GLUCOSE 101* 104* 109* 113* 107* 118*   CALCIUM 10.2 9.5 9.9 9.1 7.9* 8.6   MAGNESIUM  --  2.1 2.4 1.9 1.5* 1.6   PHOSPHORUS 5.7* 6.3* 7.2* 7.6* 6.9* 7.4*         Lab 08/13/22  0619 08/12/22  0613 08/11/22  0947 08/10/22  0938 08/09/22  0603 08/08/22  0822   TOTAL PROTEIN  --  6.7 6.7  --   --   --    ALBUMIN 3.60 3.50 3.90 3.70 3.40* 3.70   ALT (SGPT)  --  17 19  --   --   --    AST (SGOT)  --  24 31  --   --   --    BILIRUBIN  --  0.4 0.4  --   --   --    INDIRECT BILIRUBIN  --  0.2 0.2  --   --   --    BILIRUBIN DIRECT  --  0.2 0.2  --   --   --    ALK PHOS  --  64 65  --   --   --    LIPASE  --   --   --   --   --  240*                 Lab 08/08/22  1108 08/08/22  0822 08/07/22  0607   IRON  --   --  30*   IRON SATURATION  --   --  16*   TIBC  --   --  191*   TRANSFERRIN  --   --  128*   FERRITIN  --   --  4,863.00*   FOLATE  --   --  >20.00   VITAMIN B 12  --   --  >2,000*   ABO TYPING O   < >  --    RH TYPING Positive   < >  --    ANTIBODY SCREEN Negative  --   --     < > = values in this interval not displayed.         Brief Urine Lab Results  (Last result in the past 365 days)      Color   Clarity   Blood   Leuk Est   Nitrite   Protein   CREAT   Urine HCG        08/13/22 0700 Yellow   Clear   Trace   Trace   Positive   100 mg/dL (2+)               Microbiology Results (last 10 days)     Procedure Component Value - Date/Time    S. Pneumo Ag Urine or CSF - Urine, Urine, Clean Catch  [[id number]]  (Normal) Collected: 08/13/22 0700    Lab Status: Final result Specimen: Urine, Clean Catch Updated: 08/13/22 0808     Strep Pneumo Ag Negative    Legionella Antigen, Urine - Urine, Urine, Clean Catch [[id number]]  (Normal) Collected: 08/13/22 0700    Lab Status: Final result Specimen: Urine, Clean Catch Updated: 08/13/22 0808     LEGIONELLA ANTIGEN, URINE Negative    Blood Culture - Blood, Hand, Left [[id number]]  (Normal) Collected: 08/09/22 1449    Lab Status: Preliminary result Specimen: Blood from Hand, Left Updated: 08/12/22 1517     Blood Culture No growth at 3 days    Blood Culture - Blood, Arm, Right [[id number]]  (Normal) Collected: 08/09/22 1448    Lab Status: Preliminary result Specimen: Blood from Arm, Right Updated: 08/12/22 1517     Blood Culture No growth at 3 days    MRSA Screen, PCR (Inpatient) - Swab, Nares [[id number]]  (Abnormal) Collected: 08/06/22 1558    Lab Status: Final result Specimen: Swab from Nares Updated: 08/06/22 1803     MRSA PCR MRSA Detected    Narrative:      The negative predictive value of this diagnostic test is high and should only be used to consider de-escalating anti-MRSA therapy. A positive result may indicate colonization with MRSA and must be correlated clinically.    Body Fluid Culture - Body Fluid, Peritoneum [[id number]] Collected: 08/06/22 1144    Lab Status: Final result Specimen: Body Fluid from Peritoneum Updated: 08/09/22 0757     Body Fluid Culture No growth at 3 days     Gram Stain No organisms seen    Enteric Bacterial Panel - Stool, Per Rectum [[id number]]  (Normal) Collected: 08/06/22 1143    Lab Status: Final result Specimen: Stool from Per Rectum Updated: 08/07/22 1259     Salmonella Not Detected     Campylobacter Not Detected     Shigella/Enteroinvasive E. coli (EIEC) Not Detected     Shiga-like toxin-producing E. coli (STEC) stx1/stx2 Not Detected    Clostridioides difficile Toxin - Stool, Per Rectum [[id number]]  (Abnormal) Collected: 08/06/22  1143    Lab Status: Final result Specimen: Stool from Per Rectum Updated: 08/06/22 1655    Narrative:      The following orders were created for panel order Clostridioides difficile Toxin - Stool, Per Rectum.  Procedure                               Abnormality         Status                     ---------                               -----------         ------                     Clostridioides difficile...[[id number]]  Abnormal            Final result                 Please view results for these tests on the individual orders.    Clostridioides difficile Toxin, PCR - Stool, Per Rectum [[id number]]  (Abnormal) Collected: 08/06/22 1143    Lab Status: Final result Specimen: Stool from Per Rectum Updated: 08/06/22 1655     C. Difficile Toxins by PCR Positive     027 Toxin Presumptive Negative    Blood Culture - Blood, Hand, Right [[id number]]  (Normal) Collected: 08/06/22 1106    Lab Status: Final result Specimen: Blood from Hand, Right Updated: 08/11/22 1117     Blood Culture No growth at 5 days    Blood Culture - Blood, Arm, Right [[id number]]  (Normal) Collected: 08/06/22 1055    Lab Status: Final result Specimen: Blood from Arm, Right Updated: 08/11/22 1117     Blood Culture No growth at 5 days        Labs Pending at Discharge:   Pending Labs     Order Current Status    Blood Culture - Blood, Arm, Right Preliminary result    Blood Culture - Blood, Hand, Left Preliminary result        Time spent on Discharge including face to face service: > 30 minutes  Electronically signed by DELIO San, 08/13/22, 11:34 AM EDT.       Attending documentation:  I reviewed the above documentation and independently reviewed and rounded and evaluated the patient and discussed the care plan with NYA De Leon PA-C, I agree with his findings and plan as documented, what I have added to the care plan and modified is as follows in my documentation and my medical decision making and discharge plan; Unfortunate 52-year-old female with ESRD  on peritoneal dialysis, was hospitalized on 8/6/2022 with diarrhea symptoms, fevers, chills, recent COVID-19 diagnosis, diarrhea likely associated COVID-19 but also tested positive for C. difficile, placed on vancomycin, peritoneal dialysis associated peritonitis was ruled out, she remains on antibiotics including MRSA coverage as she was MRSA swab positive.  Several days of symptom management, diarrhea improved, nasal congestion improved, she started tolerating oral intake better.  With improving weakness and fatigue, she was discharged in hemodynamically stable condition on 8/13/2022 to complete course of oral vancomycin and to follow-up with nephrology as scheduled.  Patient seen on day of discharge, no acute distress, no acute major overnight events, reported improving weakness and fatigued, no new major symptoms, tolerating oral intake better.  Afebrile past 48 hours. Denies chest pain or palpitations, denies any abdominal pain or cramping abdominal symptoms.  Review of system obtained, all systems reviewed and negative except for generalized fatigue, generalized weakness.  Vitals reviewed, on physical exam, well-appearing female, patient seen lying in bed, regular rate rhythm, clear to auscultation bilaterally, soft nontender abdomen, PD catheter in place, no purulence in tubing, trace lower extreme edema.  Discharge plan reviewed with patient, all questions answered.  Total discharge time 43 minutes.  More than 72% of the time of this patient's encounter was performed by me, this included face-to-face time, planning and coordinating, medical decision making and critical thinking personally done by me.  -CURT LYNNE    Electronically signed by Tyler Rodríguez MD, 08/13/22, 3:15 PM EDT.       Dragon disclaimer:  Much of this encounter note is an electronic transcription/translation of spoken language to printed text. The electronic translation of spoken language may permit erroneous, or at times, nonsensical words  or phrases to be inadvertently transcribed. Although I have reviewed the note for such errors, some may still exist.

## 2022-08-13 NOTE — PLAN OF CARE
Goal Outcome Evaluation:      Patient discharged home on Vancomycin for CDIFF. Patient reports she feels much better and reports improvement in diarrhea. Patient picked up by family.   Problem: Adult Inpatient Plan of Care  Goal: Plan of Care Review  Outcome: Ongoing, Progressing  Flowsheets (Taken 8/13/2022 1400)  Progress: improving  Goal: Patient-Specific Goal (Individualized)  Outcome: Ongoing, Progressing  Goal: Absence of Hospital-Acquired Illness or Injury  Outcome: Ongoing, Progressing  Intervention: Identify and Manage Fall Risk  Description: Perform standard risk assessment on admission using a validated tool or comprehensive approach appropriate to the patient; reassess fall risk frequently, with change in status or transfer to another level of care.  Communicate fall injury risk to interprofessional healthcare team.  Determine need for increased observation, equipment and environmental modification, such as low bed, signage and supportive, nonskid footwear.  Adjust safety measures to individual developmental age, stage and identified risk factors.  Reinforce the importance of safety and physical activity with patient and family.  Perform regular intentional rounding to assess need for position change, pain assessment and personal needs, including assistance with toileting.  Recent Flowsheet Documentation  Taken 8/13/2022 0934 by Chelsy Kaba, RN  Safety Promotion/Fall Prevention:   safety round/check completed   nonskid shoes/slippers when out of bed   assistive device/personal items within reach   clutter free environment maintained  Intervention: Prevent Skin Injury  Description: Perform a screening for skin injury risk, such as pressure or moisture associated skin damage on admission and at regular intervals throughout hospital stay.  Keep all areas of skin (especially folds) clean and dry.  Maintain adequate skin hydration.  Relieve and redistribute pressure and protect bony prominences; implement  measures based on patient-specific risk factors.  Match turning and repositioning schedule to clinical condition.  Encourage weight shift frequently; assist with reposition if unable to complete independently.  Float heels off bed; avoid pressure on the Achilles tendon.  Keep skin free from extended contact with medical devices.  Encourage functional activity and mobility, as early as tolerated.  Use aids (e.g., slide boards, mechanical lift) during transfer.  Recent Flowsheet Documentation  Taken 8/13/2022 0934 by Chelsy Kaba RN  Body Position: position changed independently  Skin Protection:   adhesive use limited   transparent dressing maintained   tubing/devices free from skin contact   incontinence pads utilized  Intervention: Prevent and Manage VTE (Venous Thromboembolism) Risk  Description: Assess for VTE (venous thromboembolism) risk.  Encourage and assist with early ambulation.  Initiate and maintain compression or other therapy, as indicated, based on identified risk in accordance with organizational protocol and provider order.  Encourage both active and passive leg exercises while in bed, if unable to ambulate.  Recent Flowsheet Documentation  Taken 8/13/2022 0934 by Chelsy Kaba RN  Activity Management: up ad maura  VTE Prevention/Management: sequential compression devices off  Range of Motion: active ROM (range of motion) encouraged  Goal: Optimal Comfort and Wellbeing  Outcome: Ongoing, Progressing  Intervention: Monitor Pain and Promote Comfort  Description: Assess pain level, treatment efficacy and patient response at regular intervals using a consistent pain scale.  Consider the presence and impact of preexisting chronic pain.  Encourage patient and caregiver involvement in pain assessment, interventions and safety measures.  Recent Flowsheet Documentation  Taken 8/13/2022 0934 by Chelsy Kaba RN  Pain Management Interventions: quiet environment facilitated  Intervention: Provide Person-Centered  Care  Description: Use a family-focused approach to care.  Develop trust and rapport by proactively providing information, encouraging questions, addressing concerns and offering reassurance.  Acknowledge emotional response to hospitalization.  Recognize and utilize personal coping strategies.  Honor spiritual and cultural preferences.  Recent Flowsheet Documentation  Taken 8/13/2022 0934 by Chelsy Kaba RN  Trust Relationship/Rapport:   care explained   choices provided   emotional support provided   empathic listening provided   questions answered   questions encouraged   reassurance provided   thoughts/feelings acknowledged  Goal: Readiness for Transition of Care  Outcome: Ongoing, Progressing     Problem: Diarrhea  Goal: Fluid and Electrolyte Balance  Outcome: Ongoing, Progressing  Intervention: Manage Diarrhea  Description: Provide fluid and electrolyte replacement to correct any imbalance through use of oral rehydration solution, nasogastric tube or intravenous fluid therapy.  Maintain perineal skin integrity; cleanse gently and thoroughly; avoid alcohol-containing wipes.  Continue usual diet; encourage fluids (e.g., broth, soups, fruit juices).  Keep toilet, toileting devices and aids readily accessible and barrier-free to maintain safety.  Provide comfort measures and privacy.  Evaluate need for fecal-containment device to minimize skin exposure.  Implement contact precautions, if infection suspected.  Anticipate pharmacologic therapy, such as antidiarrheal, probiotic or antimicrobial agent, to decrease output.  If diarrhea persists, advocate for identification of underlying cause.  Recent Flowsheet Documentation  Taken 8/13/2022 0934 by Chelsy Kaba RN  Fluid/Electrolyte Management: fluids provided  Medication Review/Management: medications reviewed        Progress: improving

## 2022-08-13 NOTE — PLAN OF CARE
Goal Outcome Evaluation:  Progress: no change  Outcome Evaluation: VSS with persistent slight hypotension. Pt continues to be nauseated and have minimal diarrhea. Pt refused 1750mls of dialysate and only allowed 1,500 to be instilled. Pt's 2200 exchange resulted 300mls pull. Pt appears to be frustrated with persistent diarrhea and hospitalization.

## 2022-08-13 NOTE — PLAN OF CARE
Goal Outcome Evaluation:  Plan of Care Reviewed With: patient        Progress: no change  Outcome Evaluation: Patient requested to only intake 1500 ml of her PD fluid, saying she cant tolerate more than that. She has had c/o nausea but refused medication for it. She has had no appetite and drank mostly water. VSS. Continue to monitor.

## 2022-08-14 ENCOUNTER — READMISSION MANAGEMENT (OUTPATIENT)
Dept: CALL CENTER | Facility: HOSPITAL | Age: 52
End: 2022-08-14

## 2022-08-14 LAB
BACTERIA SPEC AEROBE CULT: NORMAL
BACTERIA SPEC AEROBE CULT: NORMAL

## 2022-08-14 NOTE — OUTREACH NOTE
Prep Survey    Flowsheet Row Responses   Scientologist facility patient discharged from? [facility]   Is LACE score < 7 ? No   Emergency Room discharge w/ pulse ox? No   Eligibility Readm Mgmt   Discharge diagnosis COVID   Does the patient have one of the following disease processes/diagnoses(primary or secondary)? COVID-19   Does the patient have Home health ordered? No   Is there a DME ordered? Yes   What DME was ordered? [employer] Chair   General alerts for this patient ESRD and C Diff   Prep survey completed? Yes          QUYEN LOVE - Registered Nurse

## 2022-08-14 NOTE — OUTREACH NOTE
COVID-19 Week 1 Survey    Flowsheet Row Responses   [facility] patient discharged from? [facility]   Does the patient have one of the following disease processes/diagnoses(primary or secondary)? COVID-19   COVID-19 underlying condition? None   Call Number Call 1   Week 1 Call successful? Yes   Call start time [phone]   Call end time [phone]   General alerts for this patient ESRD and C Diff   Discharge diagnosis COVID   Meds reviewed with patient/caregiver? Yes   Is the patient having any side effects they believe may be caused by any medication additions or changes? No   Does the patient have all medications ordered at discharge? Yes   Is the patient taking all medications as directed (includes completed medication regime)? Yes   Does the patient have a primary care provider?  Yes   Does the patient have an appointment with their PCP or specialist within 7 days of discharge? Yes   Has the patient kept scheduled appointments due by today? N/A   Has home health visited the patient within 72 hours of discharge? N/A   Psychosocial issues? No   Did the patient receive a copy of their discharge instructions? Yes   Did the patient receive a copy of COVID-19 specific instructions? Yes   Nursing interventions Reviewed instructions with patient   What is the patient's perception of their health status since discharge? Improving   Does the patient have any of the following symptoms? None   Nursing Interventions Nurse provided patient education   Pulse Ox monitoring None   Is the patient/caregiver able to teach back steps to recovery at home? Set small, achievable goals for return to baseline health, Rest and rebuild strength, gradually increase activity, Eat a well-balance diet, Make a list of questions for provider's appointment   If the patient is a current smoker, are they able to teach back resources for cessation? Not a smoker   Is the patient/caregiver able to teach back the hierarchy of who to call/visit for  symptoms/problems? PCP, Specialist, Home health nurse, Urgent Care, ED, 911 Yes   COVID-19 call completed? Yes   Revoked No further contact(revokes)-requires comment   Is the patient interested in additional calls from an ambulatory ?  NOTE:  applies to high risk patients requiring additional follow-up. No   Wrap up additional comments Admitted for CDiff no covid symptoms remaining. She follows with Cards and Nephrology.          LEEANNA LOVE - Registered Nurse

## 2023-11-01 NOTE — PROGRESS NOTES
[facility]   Hospitalist Progress Note  Date: 2022  Patient Name: Joelle Robison  : 1970  MRN: [id number]  Date of admission: 2022      Subjective   Subjective     Chief Complaint: Vomiting and diarrhea for past few days    Summary:   Joelle Robison is a 52 y.o. female with past medical history of end-stage renal disease due to polycystic kidney disease on home peritoneal dialysis by Dr. Leung, anemia of chronic kidney disease, hypertension and chronic systolic heart failure.  Patient recently came back from [place] about a week ago and contracted COVID since then.  Patient initially started with fever and chills.  Later on developed diarrhea and vomiting.  She is having frequent watery stools without any blood in it multiple times a day.  Also multiple episodes of nonbloody vomiting not able to keep anything down with decreased appetite.  Does have some nausea.  Her last vomiting was yesterday.  Last diarrhea was today..  She has mild right lower quadrant abdominal pain.  Patient has not able to peritoneal dialysis for past 2 days.  Patient has not been able to do her peritoneal dialysis for past 2 days because of this reason  Patient denies any chest pain, shortness of breath, cough, loss of taste or smell.  Patient presented to outside facility and was transferred to our hospital because of her peritoneal dialysis care.  She denies any urinary symptoms.  Denies any dizziness.  Patient denies any possible bad food exposure or recent use of antibiotics.  No other family member sick with vomiting or diarrhea.  Patient has not noticed any cloudiness in her peritoneal fluid.  Stool C. difficile came back positive.  First episode for the patient    Interval Followup:   Still spiking temperature 103.  Occasional bradycardia down to 40.  Asymptomatic.    97% saturation on room air.    Denies any chest pain, shortness of breath or cough.  No nausea or vomiting.  Abdominal cramping with diarrhea  only  Continues to have a watery diarrhea.  Although frequency is much better but still unable to control it.  Getting somewhat more formed  Appetite okay.  Tolerating diet well  Getting peritoneal dialysis  Patient denies any urinary complaints.  Does make urine    Review of Systems   All systems were reviewed and negative except for: Summary and interval follow-up    Objective   Objective     Vitals:   Temp:  [98.4 °F (36.9 °C)-103.1 °F (39.5 °C)] 98.6 °F (37 °C)  Heart Rate:  [] 69  Resp:  [17-18] 18  BP: ()/(46-84) 102/81  Physical Exam    Constitutional: Awake, alert, no acute distress              Eyes: Pupils equal, sclerae anicteric, no conjunctival injection              HENT: NCAT, mucous membranes moist              Neck: Supple, no thyromegaly, no lymphadenopathy, trachea midline              Respiratory: Clear to auscultation bilaterally, nonlabored respirations               Cardiovascular: RRR, no murmurs, rubs, or gallops, palpable pedal pulses bilaterally              Gastrointestinal: Positive bowel sounds, soft, nontender, distended.  Minimally tender right lower quadrant no rebound guarding .  Peritoneal dialysis catheter in place in left mid abdomen no drainage, redness or pus at insertion site              Musculoskeletal: No bilateral ankle edema, no clubbing or cyanosis to extremities              Psychiatric: Appropriate affect, cooperative              Neurologic: Oriented x 3, strength symmetric in all extremities, Cranial Nerves grossly intact to confrontation, speech clear              Skin: No rashes          Result Review    Result Review:  I have personally reviewed the results from the time of this admission to 8/9/2022 15:55 EDT and agree with these findings:  [x]  Laboratory  [x]  Microbiology  [x]  Radiology  [x]  EKG/Telemetry   []  Cardiology/Vascular   [x]  Pathology  [x]  Old records  [x]  Other: Medications     Assessment & Plan   Assessment / Plan      Assessment:  COVID pneumonia  without hypoxemia  Gastroenteritis.  Likely from COVID.  Improving  C. difficile proctitis.  First episode  End-stage renal disease on peritoneal dialysis.  Missed dialysis for 2 days prior to admission.  Polycystic kidney disease causing above.  Anemia of chronic kidney disease.  On Epogen.  Refused blood transfusion  Iron deficiency anemia.  Supplemented.    Systolic CHF.  Stable.  Hypertension.  Anion gap acidosis.  Improved  Hypomagnesemia.  Supplemented  Thrombocytopenia.  Improving  MRSA PCR positive.     Plan:   Patient refused blood transfusion  P.o. Vanco for 10 days for C. difficile proctitis  We will add p.o. Flagyl because of ongoing diarrhea and spiking temperature.  Repeat blood culture because of febrile episode  No treatment needed for COVID infection.  Patient has symptoms for 7 days and is not hypoxemic.  Continue p.o bicarb. as per nephrology  Cholestyramine for diarrhea as scheduled  As needed Imodium.  Stool culture negative  B12 and folic acid within normal range.  Discussed with nephrology.  Appreciate input.  Resumed peritoneal dialysis  peritoneal fluid culture negative , cell count and gram stain noted.  Resumed appropriate home medication once list is updated.  We will hold home Coreg due to bradycardia   CT of the chest and abdomen pelvis noted.  Discussed with patient  Replace electrolytes.  Trend procalcitonin.  Improving  Finished Diflucan instead of nystatin as per patient request to prevent a fungal peritonitis.      Discussed plan with RN and .  p.o. Vanco for home is covered as per .  Prescription sent to pharmacy.  Discharge home when patient is no longer febrile.    DVT prophylaxis:  Mechanical DVT prophylaxis orders are present.    CODE STATUS:   Code Status (Patient has no pulse and is not breathing): CPR (Attempt to Resuscitate)  Medical Interventions (Patient has pulse or is breathing): Full Support      Part of this  note may be an electronic transcription/translation of spoken language to printed text using the Dragon Dictation System.         Electronically signed by Abbe Li MD, 08/09/22, 3:59 PM EDT.  .                fair balance

## (undated) DEVICE — ENDOPATH XCEL BLADELESS TROCARS WITH STABILITY SLEEVES: Brand: ENDOPATH XCEL

## (undated) DEVICE — DRSNG SURESITE WNDW 2.38X2.75

## (undated) DEVICE — SUT PROLN 0 CT1 30IN 8424H

## (undated) DEVICE — 3M™ STERI-STRIP™ COMPOUND BENZOIN TINCTURE 40 BAGS/CARTON 4 CARTONS/CASE C1544: Brand: 3M™ STERI-STRIP™

## (undated) DEVICE — VISUALIZATION SYSTEM: Brand: CLEARIFY

## (undated) DEVICE — ENDOPATH XCEL UNIVERSAL TROCAR STABLILITY SLEEVES: Brand: ENDOPATH XCEL

## (undated) DEVICE — BIOPATCH™ ANTIMICROBIAL DRESSING WITH CHLORHEXIDINE GLUCONATE IS A HYDROPHILLIC POLYURETHANE ABSORPTIVE FOAM WITH CHLORHEXIDINE GLUCONATE (CHG) WHICH INHIBITS BACTERIAL GROWTH UNDER THE DRESSING. THE DRESSING IS INTENDED TO BE USED TO ABSORB EXUDATE, COVER A WOUND CAUSED BY VASCULAR AND NONVASCULAR PERCUTANEOUS MEDICAL DEVICES DURING SURGERY, AS WELL AS REDUCE LOCAL INFECTION AND COLONIZATION OF MICROORGANISMS.: Brand: BIOPATCH

## (undated) DEVICE — ENDOCUT SCISSOR TIP, DISPOSABLE: Brand: RENEW

## (undated) DEVICE — NDL HYPO PRECISIONGLIDE REG 25G 1 1/2

## (undated) DEVICE — ST IRR CYSTO W/SPK 77IN LF

## (undated) DEVICE — TRAP FLD MINIVAC MEGADYNE 100ML

## (undated) DEVICE — DEV TUNNELING SUBCONTANIOUS

## (undated) DEVICE — DRSNG SURESITE WNDW 4X4.5

## (undated) DEVICE — GLV SURG BIOGEL LTX PF 7 1/2

## (undated) DEVICE — SYR LUERLOK 30CC

## (undated) DEVICE — ANTIBACTERIAL UNDYED BRAIDED (POLYGLACTIN 910), SYNTHETIC ABSORBABLE SUTURE: Brand: COATED VICRYL

## (undated) DEVICE — APPL CHLORAPREP HI/LITE 26ML ORNG

## (undated) DEVICE — SUT SILK 2/0 TIES 18IN A185H

## (undated) DEVICE — PENCL E/S ULTRAVAC TELESCP NOSE HOLSTR 10FT

## (undated) DEVICE — BNDR ABD PREMIUM/UNIV 10IN 27TO48IN

## (undated) DEVICE — LOU LAP CHOLE: Brand: MEDLINE INDUSTRIES, INC.

## (undated) DEVICE — SUT MNCRYL PLS ANTIB UD 4/0 PS2 18IN

## (undated) DEVICE — SPNG GZ WOVN 4X4IN 12PLY 10/BX STRL

## (undated) DEVICE — TROCAR SITE CLOSURE DEVICE: Brand: ENDO CLOSE

## (undated) DEVICE — SOL NACL 0.9PCT 1000ML

## (undated) DEVICE — STPLR SKIN VISISTAT WD 35CT